# Patient Record
Sex: MALE | Race: WHITE | Employment: FULL TIME | URBAN - METROPOLITAN AREA
[De-identification: names, ages, dates, MRNs, and addresses within clinical notes are randomized per-mention and may not be internally consistent; named-entity substitution may affect disease eponyms.]

---

## 2017-05-04 PROBLEM — E66.09 NON MORBID OBESITY DUE TO EXCESS CALORIES: Status: ACTIVE | Noted: 2017-05-04

## 2017-05-04 PROBLEM — G56.20 TARDY ULNAR NERVE PALSY: Status: ACTIVE | Noted: 2017-05-04

## 2017-05-04 PROBLEM — G56.01 CARPAL TUNNEL SYNDROME OF RIGHT WRIST: Status: ACTIVE | Noted: 2017-05-04

## 2017-05-04 PROBLEM — S29.012A RHOMBOID MUSCLE STRAIN: Status: ACTIVE | Noted: 2017-05-04

## 2017-05-04 PROBLEM — S29.019A STRAIN OF THORACIC REGION: Status: ACTIVE | Noted: 2017-05-04

## 2018-07-19 ENCOUNTER — INITIAL CONSULT (OUTPATIENT)
Dept: SURGERY | Age: 53
End: 2018-07-19
Payer: COMMERCIAL

## 2018-07-19 VITALS
WEIGHT: 315 LBS | TEMPERATURE: 98.1 F | DIASTOLIC BLOOD PRESSURE: 82 MMHG | BODY MASS INDEX: 40.43 KG/M2 | HEART RATE: 65 BPM | OXYGEN SATURATION: 95 % | HEIGHT: 74 IN | SYSTOLIC BLOOD PRESSURE: 132 MMHG

## 2018-07-19 DIAGNOSIS — Z12.11 COLON CANCER SCREENING: Primary | ICD-10-CM

## 2018-07-19 PROCEDURE — 99203 OFFICE O/P NEW LOW 30 MIN: CPT | Performed by: SURGERY

## 2018-07-19 RX ORDER — GABAPENTIN 800 MG/1
TABLET ORAL
Refills: 1 | COMMUNITY
Start: 2018-06-15 | End: 2018-08-08 | Stop reason: ALTCHOICE

## 2018-07-19 RX ORDER — LEVOTHYROXINE SODIUM 0.05 MG/1
TABLET ORAL
COMMUNITY
Start: 2018-04-27

## 2018-07-19 RX ORDER — LEVOTHYROXINE SODIUM 0.03 MG/1
TABLET ORAL
Refills: 1 | COMMUNITY
Start: 2018-04-27 | End: 2018-08-08 | Stop reason: SDUPTHER

## 2018-07-19 RX ORDER — POLYETHYLENE GLYCOL 3350 17 G/17G
POWDER, FOR SOLUTION ORAL
Refills: 3 | COMMUNITY
Start: 2018-07-09 | End: 2018-08-08 | Stop reason: ALTCHOICE

## 2018-07-19 RX ORDER — PAZOPANIB HYDROCHLORIDE 200 MG/1
TABLET, FILM COATED ORAL
COMMUNITY
Start: 2018-07-03 | End: 2018-08-22 | Stop reason: ALTCHOICE

## 2018-07-19 RX ORDER — OXYCODONE HYDROCHLORIDE AND ACETAMINOPHEN 5; 325 MG/1; MG/1
TABLET ORAL
Refills: 0 | COMMUNITY
Start: 2018-07-10 | End: 2018-08-08 | Stop reason: ALTCHOICE

## 2018-07-19 ASSESSMENT — ENCOUNTER SYMPTOMS
BACK PAIN: 0
TROUBLE SWALLOWING: 0
WHEEZING: 0
CHEST TIGHTNESS: 0
EYE DISCHARGE: 0
ABDOMINAL PAIN: 0
SHORTNESS OF BREATH: 0
EYE PAIN: 0
COLOR CHANGE: 0
VOMITING: 0
ANAL BLEEDING: 0
CHOKING: 0
ABDOMINAL DISTENTION: 0

## 2018-07-19 NOTE — PROGRESS NOTES
AND TK PO ONCE D  3    amphetamine-dextroamphetamine (ADDERALL) 20 MG tablet TAKE 1 TABLET BY MOUTH 3 TIMES A DAY  0    folic acid (FOLVITE) 1 MG tablet TK 1 T PO QD  1    warfarin (COUMADIN) 10 MG tablet TK 1 T PO QD  3    DULoxetine (CYMBALTA) 20 MG extended release capsule Take 1 capsule by mouth nightly 30 capsule 0    gabapentin (NEURONTIN) 300 MG capsule Take 1 capsule by mouth nightly See titration instructions 30 capsule 0    warfarin (COUMADIN) 2.5 MG tablet TK 1 T PO QD  3     No current facility-administered medications for this visit. Allergies   Allergen Reactions    Codeine     Heparin          Review of Systems   Constitutional: Negative for chills, fatigue, fever and unexpected weight change. HENT: Negative for nosebleeds and trouble swallowing. Eyes: Negative for pain and discharge. Respiratory: Negative for choking, chest tightness, shortness of breath and wheezing. Cardiovascular: Negative for chest pain, palpitations and leg swelling. Gastrointestinal: Negative for abdominal distention, abdominal pain, anal bleeding and vomiting. Genitourinary: Negative for difficulty urinating, dysuria, flank pain and urgency. Musculoskeletal: Negative for arthralgias, back pain and neck stiffness. Skin: Negative for color change and wound. Neurological: Negative for dizziness, tremors, seizures, syncope and headaches. Hematological: Negative for adenopathy. Does not bruise/bleed easily. Psychiatric/Behavioral: Negative for agitation, behavioral problems and confusion. The patient is not nervous/anxious. Vitals:    07/19/18 1354   BP: 132/82   Pulse: 65   Temp: 98.1 °F (36.7 °C)   SpO2: 95%           Physical Exam   Constitutional: He is oriented to person, place, and time. He appears well-developed and well-nourished. HENT:   Head: Normocephalic and atraumatic. Eyes: Conjunctivae are normal. Pupils are equal, round, and reactive to light.    Neck: Normal range of motion. Cardiovascular: Normal rate and normal heart sounds. Pulmonary/Chest: Effort normal. No stridor. He has no wheezes. He has no rales. Abdominal: Soft. Bowel sounds are normal. He exhibits no mass. There is no tenderness. There is no rebound and no guarding. Genitourinary: Right testis shows no mass. Left testis shows no mass. Musculoskeletal: Normal range of motion. Lymphadenopathy:     He has no cervical adenopathy. Neurological: He is alert and oriented to person, place, and time. Skin: Skin is warm and dry. Psychiatric: He has a normal mood and affect.  His behavior is normal.               Assessment/      Need fro colonoscopy    Morbid obesity  S/p gastric bypass  Metastatic kidney cancer  PE and Coumadin              Plan/    Near prohibitive risk    Refer to GI        Susana Sylvester MD

## 2018-08-08 ENCOUNTER — OFFICE VISIT (OUTPATIENT)
Dept: PALLATIVE CARE | Age: 53
End: 2018-08-08
Payer: COMMERCIAL

## 2018-08-08 VITALS
DIASTOLIC BLOOD PRESSURE: 76 MMHG | HEART RATE: 74 BPM | BODY MASS INDEX: 40.43 KG/M2 | RESPIRATION RATE: 20 BRPM | OXYGEN SATURATION: 92 % | HEIGHT: 74 IN | WEIGHT: 315 LBS | SYSTOLIC BLOOD PRESSURE: 170 MMHG | TEMPERATURE: 99.2 F

## 2018-08-08 DIAGNOSIS — G47.00 INSOMNIA, UNSPECIFIED TYPE: ICD-10-CM

## 2018-08-08 DIAGNOSIS — Z71.89 COUNSELING AND COORDINATION OF CARE: ICD-10-CM

## 2018-08-08 DIAGNOSIS — Z51.5 PALLIATIVE CARE ENCOUNTER: ICD-10-CM

## 2018-08-08 DIAGNOSIS — I87.2 CHRONIC STASIS DERMATITIS: ICD-10-CM

## 2018-08-08 DIAGNOSIS — K59.01 SLOW TRANSIT CONSTIPATION: ICD-10-CM

## 2018-08-08 DIAGNOSIS — G89.3 NEOPLASM RELATED PAIN: Primary | ICD-10-CM

## 2018-08-08 DIAGNOSIS — C64.1 RENAL CELL CARCINOMA OF RIGHT KIDNEY (HCC): ICD-10-CM

## 2018-08-08 DIAGNOSIS — R45.86 EMOTIONAL LABILITY: ICD-10-CM

## 2018-08-08 PROCEDURE — 99205 OFFICE O/P NEW HI 60 MIN: CPT | Performed by: NURSE PRACTITIONER

## 2018-08-08 RX ORDER — AMOXICILLIN 250 MG
1 CAPSULE ORAL 2 TIMES DAILY
Qty: 60 TABLET | Refills: 1 | Status: SHIPPED | OUTPATIENT
Start: 2018-08-08 | End: 2018-10-15 | Stop reason: ALTCHOICE

## 2018-08-08 RX ORDER — TAPENTADOL HYDROCHLORIDE 50 MG/1
TABLET, FILM COATED ORAL 3 TIMES DAILY PRN
COMMUNITY
Start: 2018-07-27 | End: 2018-08-08 | Stop reason: ALTCHOICE

## 2018-08-08 RX ORDER — AMMONIUM LACTATE 12 G/100G
LOTION TOPICAL
Qty: 1 BOTTLE | Refills: 2 | Status: SHIPPED | OUTPATIENT
Start: 2018-08-08 | End: 2018-11-27

## 2018-08-08 RX ORDER — METHYLPREDNISOLONE 4 MG/1
TABLET ORAL
COMMUNITY
Start: 2018-08-06 | End: 2018-08-08 | Stop reason: ALTCHOICE

## 2018-08-08 RX ORDER — OXYCODONE HYDROCHLORIDE 5 MG/1
TABLET ORAL
Qty: 120 TABLET | Refills: 0 | Status: SHIPPED | OUTPATIENT
Start: 2018-08-08 | End: 2018-08-22 | Stop reason: DRUGHIGH

## 2018-08-08 RX ORDER — DULOXETIN HYDROCHLORIDE 30 MG/1
30 CAPSULE, DELAYED RELEASE ORAL DAILY
Qty: 30 CAPSULE | Refills: 3 | Status: SHIPPED | OUTPATIENT
Start: 2018-08-08 | End: 2018-08-22 | Stop reason: SINTOL

## 2018-08-08 ASSESSMENT — ENCOUNTER SYMPTOMS
WHEEZING: 0
CONSTIPATION: 1
VOMITING: 0
BLURRED VISION: 0
SHORTNESS OF BREATH: 0
DIARRHEA: 0
HEMOPTYSIS: 0
BACK PAIN: 1
HEARTBURN: 0
ORTHOPNEA: 0
EYE PAIN: 0
DOUBLE VISION: 0
ABDOMINAL PAIN: 0
NAUSEA: 0
COUGH: 0
BLOOD IN STOOL: 0
SORE THROAT: 0
SPUTUM PRODUCTION: 0

## 2018-08-08 NOTE — PROGRESS NOTES
Sage Arteaga seen and examined this 48y.o. year old pt of Dr. Santos Reynolds MD at the clinic at [facility], who was referred to palliative care by Dr. Jonatan Jenkins for symptom management, advance care planning, and goals of care conversation. Pt was diagnosed with RCC in late 2016 and he is s/p radical right nephrectomy done at [facility]. 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 He has received both XRT and chemo. Medical oncology notes reviewed. He had recent disease progression with new extradural metastasis in July 2018. He is not a candidate for further XRT. Medical treatment changed. Votrient DC'd. He is pending insurance approval for Buck. Pt A&O x3, NAD, pleasant and cooperative. Wife present for visit. 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 Pain radiates down entire arm to level of wrist. No weakness of extremity noted. No change in sensation. No edema. He rates pain 9/10 at its worst and 3/10 at its best. Describes pain  As stabbing in his back and aching and throbbing to right arm. Pt saw pain management and was started on nucynta and lyrica approx 2 weeks ago. He reports no improved pain relief. Pt has documented codeine allergy but states that it was more of an adverse reaction of dizziness. He c/o constipation often going 3-4 days with no BM. Stool hard to pass. Denies n/v or abd pain. He is taking miralax prn with little effect. Appetite good. No weight loss noted. Denies dysphagia or dysgeusia. He c/o insomnia. He is  and reports unusual sleeping hours and habits. He also c/o depression and sense of being overwhelmed with changes in his life related to cancer and treatment thereof. Ambulates independently. Strength is good.  There has been no anxiety or agitation episodes. No reports of suicidal or homicidal ideation. He wishes to remain a full code. No LW or HCPOA in place. Past Surgical History:   Procedure Laterality Date    CHOLECYSTECTOMY      GASTRIC BYPASS SURGERY N/A     KIDNEY REMOVAL Right      Breast Cancer-related family history is not on file. Social History     Social History    Marital status:      Spouse name: N/A    Number of children: N/A    Years of education: N/A     Occupational History    Not on file. Social History Main Topics    Smoking status: Never Smoker    Smokeless tobacco: Never Used    Alcohol use No    Drug use: No    Sexual activity: Not on file     Other Topics Concern    Not on file     Social History Narrative    No narrative on file       Review of Systems   Constitutional: Negative for chills, diaphoresis, fever, malaise/fatigue and weight loss. HENT: Negative for congestion, hearing loss and sore throat. Eyes: Negative for blurred vision, double vision and pain. Respiratory: Negative for cough, hemoptysis, sputum production, shortness of breath and wheezing. Cardiovascular: Positive for leg swelling (chronic). Negative for chest pain, palpitations, orthopnea and claudication. Gastrointestinal: Positive for constipation. Negative for abdominal pain, blood in stool, diarrhea, heartburn, melena, nausea and vomiting. Genitourinary: Negative for dysuria, frequency, hematuria and urgency. Musculoskeletal: Positive for back pain. Skin: Negative for itching and rash. Neurological: Negative for dizziness, tingling, tremors, sensory change, speech change, focal weakness, seizures, loss of consciousness, weakness and headaches. Psychiatric/Behavioral: Positive for depression. Negative for hallucinations, memory loss, substance abuse and suicidal ideas. The patient has insomnia. The patient is not nervous/anxious.        BP (!) 170/76 (Site: Left Arm, Position: Sitting)   Pulse 74   Temp support. Much active listening, presence, and emotional support were given. Thank you for the opportunity you have allowed us to provide palliative care to this patient in need of comfort and support. We will continue to be in touch with you as care progresses. Return in about 2 weeks (around 8/22/2018), or if symptoms worsen or fail to improve.     Total time spent: 65 mins  >50% time spent counseling and coordination of care: yes     Nani Casas, APRN - CNP    Collaborating physicians: Dr Johan Kelly and Dr Karolina Chew

## 2018-08-22 ENCOUNTER — OFFICE VISIT (OUTPATIENT)
Dept: PALLATIVE CARE | Age: 53
End: 2018-08-22
Payer: COMMERCIAL

## 2018-08-22 VITALS
BODY MASS INDEX: 54.54 KG/M2 | DIASTOLIC BLOOD PRESSURE: 94 MMHG | OXYGEN SATURATION: 92 % | WEIGHT: 315 LBS | SYSTOLIC BLOOD PRESSURE: 158 MMHG | HEART RATE: 73 BPM | TEMPERATURE: 99.6 F | RESPIRATION RATE: 20 BRPM

## 2018-08-22 DIAGNOSIS — C64.9 RENAL CELL CARCINOMA, UNSPECIFIED LATERALITY (HCC): ICD-10-CM

## 2018-08-22 DIAGNOSIS — G89.3 NEOPLASM RELATED PAIN: Primary | ICD-10-CM

## 2018-08-22 DIAGNOSIS — Z51.5 PALLIATIVE CARE ENCOUNTER: ICD-10-CM

## 2018-08-22 DIAGNOSIS — R45.86 EMOTIONAL LABILITY: ICD-10-CM

## 2018-08-22 DIAGNOSIS — I10 HYPERTENSION, UNSPECIFIED TYPE: ICD-10-CM

## 2018-08-22 PROCEDURE — 99215 OFFICE O/P EST HI 40 MIN: CPT | Performed by: NURSE PRACTITIONER

## 2018-08-22 RX ORDER — FENTANYL 25 UG/H
1 PATCH TRANSDERMAL
Qty: 3 PATCH | Refills: 0 | Status: SHIPPED | OUTPATIENT
Start: 2018-08-22 | End: 2018-09-01

## 2018-08-22 RX ORDER — OXYCODONE HYDROCHLORIDE 15 MG/1
15 TABLET ORAL EVERY 4 HOURS PRN
Qty: 90 TABLET | Refills: 0 | Status: SHIPPED | OUTPATIENT
Start: 2018-08-22 | End: 2018-09-06 | Stop reason: DRUGHIGH

## 2018-08-22 ASSESSMENT — ENCOUNTER SYMPTOMS
CHEST TIGHTNESS: 0
CONSTIPATION: 0
WHEEZING: 0
SHORTNESS OF BREATH: 0
ABDOMINAL PAIN: 0
VOMITING: 0
BACK PAIN: 1
ABDOMINAL DISTENTION: 0
NAUSEA: 0
DIARRHEA: 0
TROUBLE SWALLOWING: 0
COLOR CHANGE: 0
COUGH: 0

## 2018-08-22 NOTE — PROGRESS NOTES
Social History Narrative    No narrative on file     Allergies   Allergen Reactions    Codeine     Heparin      Current Outpatient Prescriptions on File Prior to Visit   Medication Sig Dispense Refill    senna-docusate (SENOKOT S) 8.6-50 MG per tablet Take 1 tablet by mouth 2 times daily 60 tablet 1    ammonium lactate (LAC-HYDRIN) 12 % lotion Apply topically daily. 1 Bottle 2    levothyroxine (SYNTHROID) 25 MCG tablet TK 1 T PO D      amphetamine-dextroamphetamine (ADDERALL) 20 MG tablet TAKE 1 TABLET BY MOUTH 3 TIMES A DAY  0    folic acid (FOLVITE) 1 MG tablet TK 1 T PO QD  1    warfarin (COUMADIN) 10 MG tablet TK 1 T PO QD  3     No current facility-administered medications on file prior to visit. Review of Systems   Constitutional: Negative for activity change, appetite change, chills, fatigue, fever and unexpected weight change. HENT: Negative for congestion, hearing loss, mouth sores and trouble swallowing. Eyes: Negative for visual disturbance. Respiratory: Negative for cough, chest tightness, shortness of breath and wheezing. Cardiovascular: Negative for chest pain, palpitations and leg swelling. Gastrointestinal: Negative for abdominal distention, abdominal pain, constipation, diarrhea, nausea and vomiting. Musculoskeletal: Positive for back pain. Negative for gait problem. Skin: Negative for color change and rash. Neurological: Negative for dizziness, tremors, seizures, syncope, facial asymmetry, speech difficulty, weakness, light-headedness, numbness and headaches. Psychiatric/Behavioral: Positive for dysphoric mood. Negative for agitation, confusion, hallucinations, sleep disturbance and suicidal ideas. The patient is not nervous/anxious.           Objective:   BP (!) 158/94 (Site: Right Arm, Position: Sitting)   Pulse 73   Temp 99.6 °F (37.6 °C)   Resp 20   Wt (!) 424 lb 12.8 oz (192.7 kg)   SpO2 92%   BMI 54.54 kg/m²     Physical Exam   Constitutional: He unspecified type  - Advised pt to get BP cuff and check BP twice daily and record in log. Call for SBP >160. If BP elevated at home Advised him to make appt with PCP to discuss elevated BP and take BP log for PCP to review. 4. Emotional lability  - DC Cymbalta due to adverse effects, Pt only took one dose  - sertraline (ZOLOFT) 50 MG tablet; Take 1/2 tab PO daily x 2 weeks then 1 tab PO daily therafter  Dispense: 30 tablet; Refill: 3    5. Palliative care encounter  Call for any questions, concerns or change in condition. Much support, guidance and active listening provided. Return in about 7 days (around 8/29/2018), or if symptoms worsen or fail to improve.     Karen Chaparro, APRN - CNP    Collaborating Physicians: Dr Omar Pruitt and Dr Munira Cherry

## 2018-09-05 ENCOUNTER — TELEPHONE (OUTPATIENT)
Dept: PALLATIVE CARE | Age: 53
End: 2018-09-05

## 2018-09-05 DIAGNOSIS — Z51.5 PALLIATIVE CARE PATIENT: ICD-10-CM

## 2018-09-05 DIAGNOSIS — C64.9 METASTATIC RENAL CELL CARCINOMA, UNSPECIFIED LATERALITY (HCC): ICD-10-CM

## 2018-09-05 DIAGNOSIS — G89.3 NEOPLASM RELATED PAIN: Primary | ICD-10-CM

## 2018-09-06 ENCOUNTER — CLINICAL DOCUMENTATION (OUTPATIENT)
Dept: PALLATIVE CARE | Age: 53
End: 2018-09-06

## 2018-09-06 RX ORDER — FENTANYL 50 UG/H
1 PATCH TRANSDERMAL
Qty: 3 PATCH | Refills: 0 | Status: SHIPPED | OUTPATIENT
Start: 2018-09-06 | End: 2018-09-10 | Stop reason: SDUPTHER

## 2018-09-06 RX ORDER — DEXAMETHASONE 1 MG
1 TABLET ORAL 2 TIMES DAILY WITH MEALS
Qty: 60 TABLET | Refills: 0 | Status: SHIPPED | OUTPATIENT
Start: 2018-09-06 | End: 2018-10-02 | Stop reason: ALTCHOICE

## 2018-09-06 RX ORDER — OXYCODONE HYDROCHLORIDE 20 MG/1
20 TABLET ORAL EVERY 4 HOURS PRN
Qty: 42 TABLET | Refills: 0 | Status: SHIPPED | OUTPATIENT
Start: 2018-09-06 | End: 2018-10-08 | Stop reason: SDUPTHER

## 2018-09-10 DIAGNOSIS — Z51.5 PALLIATIVE CARE PATIENT: ICD-10-CM

## 2018-09-10 DIAGNOSIS — C64.9 METASTATIC RENAL CELL CARCINOMA, UNSPECIFIED LATERALITY (HCC): ICD-10-CM

## 2018-09-10 DIAGNOSIS — G89.3 NEOPLASM RELATED PAIN: ICD-10-CM

## 2018-09-10 RX ORDER — FENTANYL 50 UG/H
1 PATCH TRANSDERMAL
Qty: 3 PATCH | Refills: 0 | Status: SHIPPED | OUTPATIENT
Start: 2018-09-10 | End: 2018-10-15 | Stop reason: ALTCHOICE

## 2018-09-10 NOTE — TELEPHONE ENCOUNTER
Pt called to reschedule missed appt. Scheduled for next Wed - if cancellation tomorrow, I will add him sooner. Only has 2 Fentanyl patches left. Requesting refill.  TY

## 2018-10-02 ENCOUNTER — OFFICE VISIT (OUTPATIENT)
Dept: GASTROENTEROLOGY | Age: 53
End: 2018-10-02
Payer: COMMERCIAL

## 2018-10-02 VITALS
OXYGEN SATURATION: 96 % | WEIGHT: 315 LBS | DIASTOLIC BLOOD PRESSURE: 78 MMHG | BODY MASS INDEX: 40.43 KG/M2 | TEMPERATURE: 98.1 F | SYSTOLIC BLOOD PRESSURE: 118 MMHG | HEIGHT: 74 IN | HEART RATE: 88 BPM

## 2018-10-02 DIAGNOSIS — K59.00 CONSTIPATION, UNSPECIFIED CONSTIPATION TYPE: Primary | ICD-10-CM

## 2018-10-02 PROCEDURE — 99205 OFFICE O/P NEW HI 60 MIN: CPT | Performed by: INTERNAL MEDICINE

## 2018-10-02 ASSESSMENT — ENCOUNTER SYMPTOMS
APNEA: 0
EYE DISCHARGE: 0
ABDOMINAL PAIN: 1
VOMITING: 0
BACK PAIN: 0
DIARRHEA: 0
COUGH: 0
SHORTNESS OF BREATH: 0
EYE PAIN: 0
CONSTIPATION: 1
EYE ITCHING: 0

## 2018-10-02 NOTE — PROGRESS NOTES
2.         Screening of colon cancer. Plan:         Diagnosis Orders   1. Constipation, unspecified constipation type             Differential diagnosis: Low fiber diet, Organic (Colorectal cancer, extraintestinal mass, postinflammatory, ischemic, or surgical stenosis), anal strictures, Drugs Opiates. Increase fluid intake: 10-12 glasses of water  Normal TSH, calcium  Miralax 17 grams every 12 hours PRN     I offered the patient a colonoscopy with anesthesia for further evaluation, and as a screening as well. The risks, benefits and alternatives of the procedure were explained to the patient in detail; including but not limited to; reaction to medication, infection, bleeding, perforation and the need for possible emergency surgery should a complication arise. An opportunity for questions was provided and the patient and his wife does not want to proceed with a colonoscopy at the moment. if he changes his mind and would like a colonoscopy he will return to my office. The patient verbalizes understanding and is in agreement with the assessment and plan. The patient was provided with the opportunity to ask questions during this encounter and all questions were answered to the patient's satisfaction.    - >50% of the 40 minutes was spent on counseling, coordinating care based on my plan and assessment as noted above.

## 2018-10-08 ENCOUNTER — TELEPHONE (OUTPATIENT)
Dept: PALLATIVE CARE | Age: 53
End: 2018-10-08

## 2018-10-08 DIAGNOSIS — C64.9 METASTATIC RENAL CELL CARCINOMA, UNSPECIFIED LATERALITY (HCC): ICD-10-CM

## 2018-10-08 DIAGNOSIS — Z51.5 PALLIATIVE CARE PATIENT: ICD-10-CM

## 2018-10-08 DIAGNOSIS — G89.3 NEOPLASM RELATED PAIN: ICD-10-CM

## 2018-10-08 RX ORDER — OXYCODONE HYDROCHLORIDE 20 MG/1
20 TABLET ORAL EVERY 4 HOURS PRN
Qty: 42 TABLET | Refills: 0 | Status: SHIPPED | OUTPATIENT
Start: 2018-10-08 | End: 2018-11-01 | Stop reason: SDUPTHER

## 2018-10-08 RX ORDER — FENTANYL 75 UG/H
1 PATCH TRANSDERMAL
Qty: 5 PATCH | Refills: 0 | Status: SHIPPED | OUTPATIENT
Start: 2018-10-08 | End: 2018-10-16 | Stop reason: DRUGHIGH

## 2018-10-15 ENCOUNTER — OFFICE VISIT (OUTPATIENT)
Dept: PALLATIVE CARE | Age: 53
End: 2018-10-15
Payer: COMMERCIAL

## 2018-10-15 VITALS
RESPIRATION RATE: 20 BRPM | HEART RATE: 78 BPM | SYSTOLIC BLOOD PRESSURE: 136 MMHG | OXYGEN SATURATION: 96 % | DIASTOLIC BLOOD PRESSURE: 64 MMHG | WEIGHT: 315 LBS | BODY MASS INDEX: 55.77 KG/M2 | TEMPERATURE: 98.1 F

## 2018-10-15 DIAGNOSIS — G89.3 CHRONIC NEOPLASM-RELATED PAIN: Primary | ICD-10-CM

## 2018-10-15 DIAGNOSIS — K59.00 CONSTIPATION, UNSPECIFIED CONSTIPATION TYPE: ICD-10-CM

## 2018-10-15 DIAGNOSIS — C64.1 RENAL CELL CARCINOMA OF RIGHT KIDNEY METASTATIC TO OTHER SITE (HCC): ICD-10-CM

## 2018-10-15 DIAGNOSIS — Z51.5 PALLIATIVE CARE ENCOUNTER: ICD-10-CM

## 2018-10-15 DIAGNOSIS — R06.02 SOB (SHORTNESS OF BREATH): ICD-10-CM

## 2018-10-15 PROCEDURE — 99215 OFFICE O/P EST HI 40 MIN: CPT | Performed by: NURSE PRACTITIONER

## 2018-10-15 RX ORDER — POLYETHYLENE GLYCOL 3350 17 G/17G
17 POWDER, FOR SOLUTION ORAL DAILY PRN
Status: ON HOLD | COMMUNITY
Start: 2018-07-09 | End: 2018-12-06

## 2018-10-15 RX ORDER — GABAPENTIN 300 MG/1
300 CAPSULE ORAL 3 TIMES DAILY
Qty: 90 CAPSULE | Refills: 3 | Status: SHIPPED | OUTPATIENT
Start: 2018-10-15 | End: 2018-11-27

## 2018-10-15 RX ORDER — SENNOSIDES 8.6 MG
1 CAPSULE ORAL 2 TIMES DAILY
Refills: 1 | COMMUNITY
Start: 2018-08-08 | End: 2018-10-15 | Stop reason: SDUPTHER

## 2018-10-15 RX ORDER — SENNOSIDES 8.6 MG
3 CAPSULE ORAL 2 TIMES DAILY
Qty: 180 CAPSULE | Refills: 1
Start: 2018-10-15 | End: 2018-11-27

## 2018-10-15 RX ORDER — POLYETHYLENE GLYCOL 3350 17 G/17G
17 POWDER, FOR SOLUTION ORAL DAILY
Qty: 510 G | Refills: 0
Start: 2018-10-15 | End: 2018-11-01 | Stop reason: SDUPTHER

## 2018-10-15 ASSESSMENT — ENCOUNTER SYMPTOMS
WHEEZING: 0
CHEST TIGHTNESS: 0
COLOR CHANGE: 0
COUGH: 0
SHORTNESS OF BREATH: 1
CONSTIPATION: 1
BACK PAIN: 1
TROUBLE SWALLOWING: 0
VOMITING: 0
DIARRHEA: 0
ABDOMINAL DISTENTION: 0
ABDOMINAL PAIN: 0
NAUSEA: 0

## 2018-10-15 NOTE — PROGRESS NOTES
Subjective:      Patient Id:  Lesia Emery is a 48 y.o. male who presents today with   Chief Complaint   Patient presents with    Arm Pain    Back Pain     R upper    Shortness of Breath     with activity    and is seen at the clinic at [facility] Pt seen and examined at [facility] for routine follow up visit for sx management r/t metastatic RCC. Pt has not been seen in 2 months due to cancelled appts. He reports that he was admitted to [facility] in Sept for SOB and according to patient he was found to have partially collapsed lung. He was hospitalized for 13 days. No hospital records available for review at today's visit. He was discharged to home on steroids and he is now tapered off of those as of 1-2 weeks ago. Overall he reports his SOB is improved since hospitalized. He still c/o ROLLE. C/o feeling feverish and chills x 2 days but when he took his temperature it was normal. He denies cough or wheeze or cp/pressure. No new edema. Pt with c/o pain to right arm, He describes it as cold, numb and burning. Pain is present constantly. Pain improves with use of prn oxycodone for a few hours. Pain is 10/10 at its worst and then 3-4/10 at its best.    Pt is scheduled for follow up scans to assess cancer status at the beginning of Nov.    Pt reports that he still feels constipated despite taking senna 2 tabs BID. Denies n/v or abd pain. 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 Social History Main Topics    Smoking status: Never Smoker    Smokeless tobacco: Never Used    Alcohol use No    Drug use: No    Sexual activity: Not on file     Other Topics Concern    Not on file     Social History Narrative    No narrative on file     Allergies   Allergen Reactions    Codeine     Heparin      Current Outpatient Prescriptions on File Prior to Visit   Medication Sig Dispense Refill    oxyCODONE (ROXICODONE) 20 MG immediate release tablet Take 1 tablet by mouth every 4 hours as needed for Pain for up to 7 days. . 42 tablet 0    fentaNYL (DURAGESIC) 75 MCG/HR Place 1 patch onto the skin every 72 hours for 15 days. . 5 patch 0    ammonium lactate (LAC-HYDRIN) 12 % lotion Apply topically daily. 1 Bottle 2    levothyroxine (SYNTHROID) 25 MCG tablet TK 1 T PO D      folic acid (FOLVITE) 1 MG tablet TK 1 T PO QD  1    warfarin (COUMADIN) 10 MG tablet TK 1 T PO QD  3    amphetamine-dextroamphetamine (ADDERALL) 20 MG tablet TAKE 1 TABLET BY MOUTH 3 TIMES A DAY  0     No current facility-administered medications on file prior to visit. Review of Systems   Constitutional: Positive for chills and fatigue. Negative for activity change, appetite change, fever and unexpected weight change. HENT: Negative for congestion, hearing loss, mouth sores and trouble swallowing. Respiratory: Positive for shortness of breath. Negative for cough, chest tightness and wheezing. Cardiovascular: Positive for leg swelling (chronic). Negative for chest pain and palpitations. Gastrointestinal: Positive for constipation. Negative for abdominal distention, abdominal pain, diarrhea, nausea and vomiting. Genitourinary: Negative for difficulty urinating, dysuria and frequency. Musculoskeletal: Positive for back pain. Negative for gait problem. Skin: Negative for color change and rash. Neurological: Negative for dizziness, syncope and headaches.    Psychiatric/Behavioral: Negative for

## 2018-10-16 ENCOUNTER — TELEPHONE (OUTPATIENT)
Dept: PALLATIVE CARE | Age: 53
End: 2018-10-16

## 2018-10-16 DIAGNOSIS — C64.1 RENAL CELL CARCINOMA OF RIGHT KIDNEY METASTATIC TO OTHER SITE (HCC): ICD-10-CM

## 2018-10-16 DIAGNOSIS — G89.3 NEOPLASM RELATED PAIN: Primary | ICD-10-CM

## 2018-10-16 DIAGNOSIS — Z51.5 PALLIATIVE CARE PATIENT: ICD-10-CM

## 2018-10-16 RX ORDER — IBUPROFEN 600 MG/1
600 TABLET ORAL EVERY 6 HOURS
Qty: 12 TABLET | Refills: 0 | Status: SHIPPED | OUTPATIENT
Start: 2018-10-16 | End: 2018-11-01 | Stop reason: ALTCHOICE

## 2018-10-16 RX ORDER — FENTANYL 100 UG/H
1 PATCH TRANSDERMAL
Qty: 10 PATCH | Refills: 0 | Status: SHIPPED | OUTPATIENT
Start: 2018-10-16 | End: 2018-11-27

## 2018-10-16 NOTE — TELEPHONE ENCOUNTER
Discussed pt case at [facility] and pt continued uncontrolled pain. Will increase fentanyl transdermal to 100mcg/hr, cont prn oxy IR as prescribed, cont gabapentin 300mg TID as started yesterday and start ibuprofen 600mg Q6hr x 3 days (take with food). Kidney function 10/10 WNL. Called and updated pt on POC. Pt agreeable. Will reach out to pain management or interventional radiology to see if there are any procedures available that may help pt with his pain control. If no improvement in pain control with current changes will consider methadone.

## 2018-11-01 ENCOUNTER — OFFICE VISIT (OUTPATIENT)
Dept: PALLATIVE CARE | Age: 53
End: 2018-11-01
Payer: COMMERCIAL

## 2018-11-01 VITALS
SYSTOLIC BLOOD PRESSURE: 118 MMHG | OXYGEN SATURATION: 96 % | RESPIRATION RATE: 18 BRPM | DIASTOLIC BLOOD PRESSURE: 82 MMHG | HEART RATE: 74 BPM

## 2018-11-01 DIAGNOSIS — Z51.5 PALLIATIVE CARE PATIENT: ICD-10-CM

## 2018-11-01 DIAGNOSIS — R60.9 EDEMA, UNSPECIFIED TYPE: ICD-10-CM

## 2018-11-01 DIAGNOSIS — C64.9 METASTATIC RENAL CELL CARCINOMA, UNSPECIFIED LATERALITY (HCC): ICD-10-CM

## 2018-11-01 DIAGNOSIS — R06.02 SOB (SHORTNESS OF BREATH): ICD-10-CM

## 2018-11-01 DIAGNOSIS — K59.00 CONSTIPATION, UNSPECIFIED CONSTIPATION TYPE: ICD-10-CM

## 2018-11-01 DIAGNOSIS — G89.3 NEOPLASM RELATED PAIN: Primary | ICD-10-CM

## 2018-11-01 PROCEDURE — 99349 HOME/RES VST EST MOD MDM 40: CPT | Performed by: NURSE PRACTITIONER

## 2018-11-01 RX ORDER — OXYCODONE HYDROCHLORIDE 20 MG/1
20 TABLET ORAL EVERY 4 HOURS PRN
Qty: 42 TABLET | Refills: 0 | Status: SHIPPED | OUTPATIENT
Start: 2018-11-01 | End: 2018-11-27

## 2018-11-01 RX ORDER — PHENAZOPYRIDINE HYDROCHLORIDE 200 MG/1
200 TABLET, FILM COATED ORAL 3 TIMES DAILY PRN
COMMUNITY
End: 2018-11-27

## 2018-11-01 ASSESSMENT — ENCOUNTER SYMPTOMS
BACK PAIN: 1
COUGH: 0
SHORTNESS OF BREATH: 0
CONSTIPATION: 0
ABDOMINAL PAIN: 0
NAUSEA: 0
DIARRHEA: 0
COLOR CHANGE: 0
WHEEZING: 0
VOMITING: 0
CHEST TIGHTNESS: 0
TROUBLE SWALLOWING: 0
ABDOMINAL DISTENTION: 0

## 2018-11-02 ENCOUNTER — TELEPHONE (OUTPATIENT)
Dept: PALLATIVE CARE | Age: 53
End: 2018-11-02

## 2018-11-02 DIAGNOSIS — G89.3 NEOPLASM RELATED PAIN: ICD-10-CM

## 2018-11-02 DIAGNOSIS — C64.9 RENAL CELL CARCINOMA, UNSPECIFIED LATERALITY (HCC): Primary | ICD-10-CM

## 2018-11-02 DIAGNOSIS — R53.81 DEBILITY: ICD-10-CM

## 2018-11-02 DIAGNOSIS — R26.81 GAIT INSTABILITY: ICD-10-CM

## 2018-11-02 DIAGNOSIS — R06.02 SOB (SHORTNESS OF BREATH): ICD-10-CM

## 2018-11-02 DIAGNOSIS — J44.9 CHRONIC OBSTRUCTIVE PULMONARY DISEASE, UNSPECIFIED COPD TYPE (HCC): ICD-10-CM

## 2018-11-02 NOTE — TELEPHONE ENCOUNTER
Called and spoke with admissions director at [facility]. Advised of pt need for inpatient skilled nursing and therapy. Was informed that since it has been greater than 48 hrs since pt hospital DC that he will need new eval and recommendations from physical therapy. Will order [facility] for nursing and PT eval and treat.  Called and updated pt wife

## 2018-11-27 ENCOUNTER — OFFICE VISIT (OUTPATIENT)
Dept: PALLATIVE CARE | Age: 53
End: 2018-11-27
Payer: COMMERCIAL

## 2018-11-27 VITALS
HEART RATE: 78 BPM | DIASTOLIC BLOOD PRESSURE: 74 MMHG | OXYGEN SATURATION: 98 % | SYSTOLIC BLOOD PRESSURE: 133 MMHG | RESPIRATION RATE: 18 BRPM

## 2018-11-27 DIAGNOSIS — G62.9 NEUROPATHY: ICD-10-CM

## 2018-11-27 DIAGNOSIS — K59.00 CONSTIPATION, UNSPECIFIED CONSTIPATION TYPE: ICD-10-CM

## 2018-11-27 DIAGNOSIS — Z51.5 PALLIATIVE CARE ENCOUNTER: ICD-10-CM

## 2018-11-27 DIAGNOSIS — C64.9 METASTATIC RENAL CELL CARCINOMA, UNSPECIFIED LATERALITY (HCC): ICD-10-CM

## 2018-11-27 DIAGNOSIS — G89.3 NEOPLASM RELATED PAIN: Primary | ICD-10-CM

## 2018-11-27 PROCEDURE — 99308 SBSQ NF CARE LOW MDM 20: CPT | Performed by: NURSE PRACTITIONER

## 2018-11-27 RX ORDER — MELOXICAM 7.5 MG/1
7.5 TABLET ORAL DAILY
COMMUNITY

## 2018-11-27 RX ORDER — FENTANYL 75 UG/H
1 PATCH TRANSDERMAL
COMMUNITY
End: 2018-12-05 | Stop reason: SDUPTHER

## 2018-11-27 RX ORDER — DEXAMETHASONE 4 MG/1
4 TABLET ORAL
COMMUNITY

## 2018-11-27 RX ORDER — GABAPENTIN 300 MG/1
900 CAPSULE ORAL 3 TIMES DAILY
COMMUNITY

## 2018-11-27 RX ORDER — TORSEMIDE 100 MG/1
100 TABLET ORAL DAILY
COMMUNITY

## 2018-11-27 RX ORDER — LACTULOSE 20 G/30ML
30 SOLUTION ORAL 3 TIMES DAILY
Status: ON HOLD | COMMUNITY
End: 2018-12-14 | Stop reason: HOSPADM

## 2018-11-27 RX ORDER — ERGOCALCIFEROL (VITAMIN D2) 1250 MCG
50000 CAPSULE ORAL WEEKLY
Status: ON HOLD | COMMUNITY
End: 2018-12-06

## 2018-11-27 ASSESSMENT — ENCOUNTER SYMPTOMS
CONSTIPATION: 1
ABDOMINAL PAIN: 0
TROUBLE SWALLOWING: 0
NAUSEA: 0
COUGH: 0
VOMITING: 0
SHORTNESS OF BREATH: 0
WHEEZING: 0
COLOR CHANGE: 0
DIARRHEA: 0
ABDOMINAL DISTENTION: 0
CHEST TIGHTNESS: 0

## 2018-11-27 NOTE — PROGRESS NOTES
progression and patients expected quality of life. I emphasized the palliative care support throughout the course of the disease regardless of choice of treatments. I noted the availability of support as needed through our , spiritual care and bereavement support team. Discussed availability of LSW, , and grief support. Much active listening, presence, and emotional support were given. Return in about 7 days (around 12/4/2018), or if symptoms worsen or fail to improve.     Jori Castañeda, APRN - CNP    Collaborating Physicians: Dr Coni Huber and Dr Brigid Zambrano

## 2018-12-03 ENCOUNTER — APPOINTMENT (OUTPATIENT)
Dept: CT IMAGING | Age: 53
End: 2018-12-03
Payer: COMMERCIAL

## 2018-12-03 ENCOUNTER — HOSPITAL ENCOUNTER (EMERGENCY)
Age: 53
Discharge: NURSING, REHAB OR LONG-TERM CARE | End: 2018-12-03
Attending: EMERGENCY MEDICINE
Payer: COMMERCIAL

## 2018-12-03 VITALS
DIASTOLIC BLOOD PRESSURE: 82 MMHG | OXYGEN SATURATION: 95 % | TEMPERATURE: 97.4 F | RESPIRATION RATE: 20 BRPM | HEART RATE: 81 BPM | SYSTOLIC BLOOD PRESSURE: 124 MMHG | BODY MASS INDEX: 40.43 KG/M2 | HEIGHT: 74 IN | WEIGHT: 315 LBS

## 2018-12-03 DIAGNOSIS — K59.00 CONSTIPATION, UNSPECIFIED CONSTIPATION TYPE: Primary | ICD-10-CM

## 2018-12-03 LAB — POC CREATININE WHOLE BLOOD: 1

## 2018-12-03 PROCEDURE — 6360000002 HC RX W HCPCS: Performed by: EMERGENCY MEDICINE

## 2018-12-03 PROCEDURE — 96372 THER/PROPH/DIAG INJ SC/IM: CPT

## 2018-12-03 PROCEDURE — 74177 CT ABD & PELVIS W/CONTRAST: CPT

## 2018-12-03 PROCEDURE — 6360000004 HC RX CONTRAST MEDICATION: Performed by: EMERGENCY MEDICINE

## 2018-12-03 PROCEDURE — 99284 EMERGENCY DEPT VISIT MOD MDM: CPT

## 2018-12-03 PROCEDURE — 2580000003 HC RX 258: Performed by: EMERGENCY MEDICINE

## 2018-12-03 RX ORDER — SODIUM CHLORIDE 0.9 % (FLUSH) 0.9 %
10 SYRINGE (ML) INJECTION ONCE
Status: COMPLETED | OUTPATIENT
Start: 2018-12-03 | End: 2018-12-03

## 2018-12-03 RX ORDER — IPRATROPIUM BROMIDE AND ALBUTEROL SULFATE 2.5; .5 MG/3ML; MG/3ML
1 SOLUTION RESPIRATORY (INHALATION)
COMMUNITY

## 2018-12-03 RX ORDER — DEXAMETHASONE 2 MG/1
2 TABLET ORAL NIGHTLY
COMMUNITY
End: 2019-01-10 | Stop reason: DRUGHIGH

## 2018-12-03 RX ORDER — SENNA PLUS 8.6 MG/1
1 TABLET ORAL 2 TIMES DAILY PRN
Status: ON HOLD | COMMUNITY
End: 2018-12-06

## 2018-12-03 RX ORDER — OXYCODONE HYDROCHLORIDE 30 MG/1
20 TABLET ORAL EVERY 4 HOURS PRN
Status: ON HOLD | COMMUNITY
End: 2018-12-06

## 2018-12-03 RX ORDER — DOCUSATE SODIUM 100 MG/1
100 CAPSULE, LIQUID FILLED ORAL 2 TIMES DAILY PRN
Status: ON HOLD | COMMUNITY
End: 2018-12-14 | Stop reason: HOSPADM

## 2018-12-03 RX ADMIN — IOPAMIDOL 100 ML: 612 INJECTION, SOLUTION INTRAVENOUS at 17:31

## 2018-12-03 RX ADMIN — METHYLNALTREXONE BROMIDE 12 MG: 12 INJECTION, SOLUTION SUBCUTANEOUS at 19:37

## 2018-12-03 RX ADMIN — METHYLNALTREXONE BROMIDE 16 MG: 12 INJECTION, SOLUTION SUBCUTANEOUS at 21:39

## 2018-12-03 RX ADMIN — Medication 10 ML: at 17:35

## 2018-12-03 ASSESSMENT — ENCOUNTER SYMPTOMS
COLOR CHANGE: 0
VOMITING: 0
EYE DISCHARGE: 0
RHINORRHEA: 0
ABDOMINAL PAIN: 1
DIARRHEA: 0
ANAL BLEEDING: 0
FACIAL SWELLING: 0
CONSTIPATION: 1
BLOOD IN STOOL: 0
SHORTNESS OF BREATH: 0

## 2018-12-03 NOTE — ED PROVIDER NOTES
noted above the remainder of the review of systems was reviewed and negative. 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)          SURGICALHISTORY       Past Surgical History:   Procedure Laterality Date    CHOLECYSTECTOMY      GASTRIC BYPASS SURGERY N/A     KIDNEY REMOVAL Right          CURRENT MEDICATIONS       Previous Medications    AMPHETAMINE-DEXTROAMPHETAMINE (ADDERALL) 20 MG TABLET    Take 1 tab PO BID    CABOZANTINIB S-MALATE (CABOMETYX) 60 MG TABS    Take 60 mg by mouth daily    DEXAMETHASONE (DECADRON) 2 MG TABLET    Take 2 mg by mouth nightly    DEXAMETHASONE (DECADRON) 4 MG TABLET    Take 4 mg by mouth daily (with breakfast)     DOCUSATE SODIUM (COLACE) 100 MG CAPSULE    Take 100 mg by mouth 2 times daily as needed for Constipation    ERGOCALCIFEROL (ERGOCALCIFEROL) 39043 UNITS CAPSULE    Take 50,000 Units by mouth once a week Mondays    FENTANYL (DURAGESIC) 75 MCG/HR    Place 1 patch onto the skin every 72 hours. Alondra Lozano FOLIC ACID (FOLVITE) 1 MG TABLET    TK 1 T PO QD    GABAPENTIN (NEURONTIN) 800 MG TABLET    Take 800 mg by mouth 3 times daily. .    INSULIN GLARGINE (BASAGLAR KWIKPEN) 100 UNIT/ML INJECTION PEN    Inject 10 Units into the skin nightly    INSULIN LISPRO (HUMALOG) 100 UNIT/ML INJECTION VIAL    Inject 4 Units into the skin 3 times daily (before meals)    IPRATROPIUM-ALBUTEROL (DUONEB) 0.5-2.5 (3) MG/3ML SOLN NEBULIZER SOLUTION    Inhale 1 vial into the lungs every 2 hours as needed for Shortness of Breath    LACTULOSE 20 GM/30ML SOLN    Take 30 mLs by mouth 3 times daily    LEVOTHYROXINE (SYNTHROID) 50 MCG TABLET    50mcg daily    LINACLOTIDE (LINZESS) 145 MCG CAPSULE    Take 290 mcg by mouth every morning (before breakfast)     MELOXICAM

## 2018-12-03 NOTE — ED NOTES
Bed: 16  Expected date: 12/3/18  Expected time: 4:16 PM  Means of arrival: [facility]  Comments:  48 m - [facility] - constipation x1 month     Simón Cerrato RN  12/03/18 6246

## 2018-12-04 ENCOUNTER — OFFICE VISIT (OUTPATIENT)
Dept: PALLATIVE CARE | Age: 53
End: 2018-12-04
Payer: COMMERCIAL

## 2018-12-04 ENCOUNTER — APPOINTMENT (OUTPATIENT)
Dept: GENERAL RADIOLOGY | Age: 53
DRG: 542 | End: 2018-12-04
Payer: COMMERCIAL

## 2018-12-04 ENCOUNTER — HOSPITAL ENCOUNTER (EMERGENCY)
Age: 53
Discharge: HOME | DRG: 542 | End: 2018-12-04
Payer: COMMERCIAL

## 2018-12-04 VITALS
RESPIRATION RATE: 20 BRPM | DIASTOLIC BLOOD PRESSURE: 71 MMHG | TEMPERATURE: 97.9 F | WEIGHT: 315 LBS | SYSTOLIC BLOOD PRESSURE: 114 MMHG | HEART RATE: 82 BPM | OXYGEN SATURATION: 93 % | BODY MASS INDEX: 40.43 KG/M2 | HEIGHT: 74 IN

## 2018-12-04 VITALS — DIASTOLIC BLOOD PRESSURE: 76 MMHG | SYSTOLIC BLOOD PRESSURE: 122 MMHG | HEART RATE: 76 BPM | RESPIRATION RATE: 18 BRPM

## 2018-12-04 DIAGNOSIS — G89.3 NEOPLASM RELATED PAIN: ICD-10-CM

## 2018-12-04 DIAGNOSIS — S81.811A NONINFECTED SKIN TEAR OF LOWER EXTREMITY, RIGHT, INITIAL ENCOUNTER: Primary | ICD-10-CM

## 2018-12-04 DIAGNOSIS — K59.01 SLOW TRANSIT CONSTIPATION: ICD-10-CM

## 2018-12-04 DIAGNOSIS — T07.XXXA MULTIPLE CONTUSIONS: ICD-10-CM

## 2018-12-04 DIAGNOSIS — C64.9 METASTATIC RENAL CELL CARCINOMA, UNSPECIFIED LATERALITY (HCC): ICD-10-CM

## 2018-12-04 DIAGNOSIS — Z51.5 PALLIATIVE CARE ENCOUNTER: ICD-10-CM
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
## 2018-12-04 DIAGNOSIS — R39.11 URINARY HESITANCY: Primary | ICD-10-CM

## 2018-12-04 LAB
ANION GAP SERPL CALCULATED.3IONS-SCNC: 12 MEQ/L (ref 7–13)
APTT: 29.9 SEC (ref 21.6–35.4)
BASOPHILS ABSOLUTE: 0 K/UL (ref 0–0.2)
BASOPHILS RELATIVE PERCENT: 0.5 %
BUN BLDV-MCNC: 33 MG/DL (ref 6–20)
CALCIUM SERPL-MCNC: 9.4 MG/DL (ref 8.6–10.2)
CHLORIDE BLD-SCNC: 90 MEQ/L (ref 98–107)
CO2: 36 MEQ/L (ref 22–29)
CREAT SERPL-MCNC: 1.01 MG/DL (ref 0.7–1.2)
EOSINOPHILS ABSOLUTE: 0 K/UL (ref 0–0.7)
EOSINOPHILS RELATIVE PERCENT: 0.5 %
GFR AFRICAN AMERICAN: >60
GFR AFRICAN AMERICAN: >60
GFR NON-AFRICAN AMERICAN: >60
GFR NON-AFRICAN AMERICAN: >60
GLUCOSE BLD-MCNC: 297 MG/DL (ref 74–109)
HCT VFR BLD CALC: 45 % (ref 42–52)
HEMOGLOBIN: 14.5 G/DL (ref 14–18)
INR BLD: 2
LYMPHOCYTES ABSOLUTE: 1.1 K/UL (ref 1–4.8)
LYMPHOCYTES RELATIVE PERCENT: 18.4 %
MCH RBC QN AUTO: 25.8 PG (ref 27–31.3)
MCHC RBC AUTO-ENTMCNC: 32.2 % (ref 33–37)
MCV RBC AUTO: 80.2 FL (ref 80–100)
MONOCYTES ABSOLUTE: 0.3 K/UL (ref 0.2–0.8)
MONOCYTES RELATIVE PERCENT: 5.8 %
NEUTROPHILS ABSOLUTE: 4.5 K/UL (ref 1.4–6.5)
NEUTROPHILS RELATIVE PERCENT: 74.8 %
PDW BLD-RTO: 17.3 % (ref 11.5–14.5)
PERFORMED ON: NORMAL
PLATELET # BLD: 92 K/UL (ref 130–400)
POC CREATININE: 1 MG/DL (ref 0.9–1.3)
POC SAMPLE TYPE: NORMAL
POTASSIUM SERPL-SCNC: 3.5 MEQ/L (ref 3.5–5.1)
PROTHROMBIN TIME: 20 SEC (ref 9–11.5)
RBC # BLD: 5.61 M/UL (ref 4.7–6.1)
SODIUM BLD-SCNC: 138 MEQ/L (ref 132–144)
WBC # BLD: 6 K/UL (ref 4.8–10.8)

## 2018-12-04 PROCEDURE — 80048 BASIC METABOLIC PNL TOTAL CA: CPT

## 2018-12-04 PROCEDURE — 2500000003 HC RX 250 WO HCPCS: Performed by: PHYSICIAN ASSISTANT

## 2018-12-04 PROCEDURE — 85610 PROTHROMBIN TIME: CPT

## 2018-12-04 PROCEDURE — 99284 EMERGENCY DEPT VISIT MOD MDM: CPT

## 2018-12-04 PROCEDURE — 85730 THROMBOPLASTIN TIME PARTIAL: CPT

## 2018-12-04 PROCEDURE — 73590 X-RAY EXAM OF LOWER LEG: CPT

## 2018-12-04 PROCEDURE — 6370000000 HC RX 637 (ALT 250 FOR IP): Performed by: PHYSICIAN ASSISTANT

## 2018-12-04 PROCEDURE — 85025 COMPLETE CBC W/AUTO DIFF WBC: CPT

## 2018-12-04 PROCEDURE — 36415 COLL VENOUS BLD VENIPUNCTURE: CPT

## 2018-12-04 PROCEDURE — 99308 SBSQ NF CARE LOW MDM 20: CPT | Performed by: NURSE PRACTITIONER

## 2018-12-04 RX ORDER — DIAPER,BRIEF,INFANT-TODD,DISP
EACH MISCELLANEOUS ONCE
Status: COMPLETED | OUTPATIENT
Start: 2018-12-04 | End: 2018-12-04

## 2018-12-04 RX ORDER — SULFAMETHOXAZOLE AND TRIMETHOPRIM 800; 160 MG/1; MG/1
1 TABLET ORAL ONCE
Status: DISCONTINUED | OUTPATIENT
Start: 2018-12-04 | End: 2018-12-04

## 2018-12-04 RX ORDER — TAMSULOSIN HYDROCHLORIDE 0.4 MG/1
0.4 CAPSULE ORAL DAILY
Qty: 30 CAPSULE | Refills: 3 | Status: ON HOLD | OUTPATIENT
Start: 2018-12-04 | End: 2018-12-06

## 2018-12-04 RX ADMIN — BACITRACIN ZINC 1 G: 500 OINTMENT TOPICAL at 16:08

## 2018-12-04 RX ADMIN — Medication: at 16:53

## 2018-12-04 ASSESSMENT — ENCOUNTER SYMPTOMS
APNEA: 0
PHOTOPHOBIA: 0
COLOR CHANGE: 1
ABDOMINAL DISTENTION: 0
BACK PAIN: 0
ABDOMINAL PAIN: 1
COUGH: 0
CHOKING: 0
NAUSEA: 0
VOICE CHANGE: 0
SHORTNESS OF BREATH: 0
EYE DISCHARGE: 0
BLOOD IN STOOL: 0
ANAL BLEEDING: 0
VOMITING: 0

## 2018-12-04 ASSESSMENT — PAIN DESCRIPTION - PAIN TYPE: TYPE: ACUTE PAIN

## 2018-12-04 ASSESSMENT — PAIN DESCRIPTION - LOCATION: LOCATION: LEG

## 2018-12-04 ASSESSMENT — PAIN DESCRIPTION - ORIENTATION: ORIENTATION: RIGHT

## 2018-12-04 ASSESSMENT — PAIN SCALES - GENERAL: PAINLEVEL_OUTOF10: 4

## 2018-12-04 ASSESSMENT — PAIN DESCRIPTION - DESCRIPTORS: DESCRIPTORS: SORE

## 2018-12-04 ASSESSMENT — PAIN SCALES - WONG BAKER: WONGBAKER_NUMERICALRESPONSE: 2

## 2018-12-04 NOTE — ED TRIAGE NOTES
Arrived via cart per [facility] from home. Was coming home from [employer] and fell up stairs when going into house. States \"My leg buckled\". Large skin tear right shin with mod amt dry dark red drainage, dressing intact.

## 2018-12-04 NOTE — ED NOTES
Report called to [facility].   ETA [address] [facility] [place][note 2] Heather Womack RN  12/03/18 6594

## 2018-12-04 NOTE — PROGRESS NOTES
Negative for cough, chest tightness, shortness of breath and wheezing. Cardiovascular: Positive for leg swelling. Negative for chest pain and palpitations. Gastrointestinal: Positive for constipation. Negative for abdominal distention, abdominal pain, diarrhea, nausea and vomiting. Genitourinary: Positive for difficulty urinating. Negative for dysuria, flank pain, frequency and hematuria. Musculoskeletal: Positive for back pain and gait problem. Skin: Negative for color change and rash. Neurological: Positive for weakness (BLE and right hand) and numbness (right hand). Negative for dizziness, tremors, seizures, syncope, speech difficulty, light-headedness and headaches. Hematological: Negative for adenopathy. Psychiatric/Behavioral: Positive for dysphoric mood. Negative for agitation, confusion, hallucinations, sleep disturbance and suicidal ideas. The patient is not nervous/anxious. Objective:   /76   Pulse 76   Resp 18     Physical Exam   Constitutional: He is oriented to person, place, and time. No distress. Fatigued and ill appearing   HENT:   Head: Normocephalic and atraumatic. Mouth/Throat: Oropharynx is clear and moist.   Neck: Neck supple. No JVD present. Cardiovascular: Normal rate and regular rhythm. Pulmonary/Chest: Effort normal and breath sounds normal. No respiratory distress. He has no wheezes. He has no rales. Abdominal: Soft. Bowel sounds are normal. He exhibits no distension. There is no tenderness. There is no rebound and no guarding. Musculoskeletal: He exhibits no edema or deformity. Lymphadenopathy:     He has no cervical adenopathy. Neurological: He is alert and oriented to person, place, and time. No cranial nerve deficit. Skin: Skin is warm and dry. No rash noted. He is not diaphoretic. Psychiatric: He has a normal mood and affect. Assessment and Plan:      1. Urinary hesitancy  - Flomax 0.4mg daily    2.  Slow transit

## 2018-12-04 NOTE — ED NOTES
Assisted patient back to bed. Patient urinated but unable to have a bowel  Movement.       Merlyn Jerry RN  12/03/18 4808

## 2018-12-04 NOTE — ED PROVIDER NOTES
extremity, right, initial encounter:   Diagnosis management comments: Concern about cellulitis has he's had it before. He does have cancer takes high-dose steroids she is aware of the elevated glucose does not have diabetes. Patient walks with a walker. leon Wilkins. Who examined wound. Pt dressing now sanguinous soaked. At 1712 hrs. patient has new dressing was seen approximately placed noserosanguineous staining of new dressing       Amount and/or Complexity of Data Reviewed  Clinical lab tests: reviewed and ordered  Tests in the radiology section of CPT®: reviewed and ordered  Discuss the patient with other providers: yes        CRITICAL CARE TIME       CONSULTS:  None    PROCEDURES:  Unless otherwise noted below, none     Procedures    FINAL IMPRESSION      1. Noninfected skin tear of lower extremity, right, initial encounter    2. Multiple contusions    3. Fall, initial encounter          DISPOSITION/PLAN   DISPOSITION        PATIENT REFERRED TO:  Ainsley Espinal MD  [place] [address].   [address]  [place] [address]    Schedule an appointment as soon as possible for a visit       [facility]) ED  [address] [address]  [phone]    If symptoms worsen      DISCHARGE MEDICATIONS:  New Prescriptions    No medications on file          (Please note that portions of this note were completed with a voice recognition program.  Efforts were made to edit the dictations but occasionally words are mis-transcribed.)    Bruno Bailey PA-C (electronically signed)  Attending Emergency Physician         Bruno Bailey PA-C  12/04/18 1958

## 2018-12-05 ENCOUNTER — HOSPITAL ENCOUNTER (INPATIENT)
Age: 53
LOS: 7 days | Discharge: NURSING, REHAB OR LONG-TERM CARE | DRG: 542 | End: 2018-12-14
Attending: INTERNAL MEDICINE | Admitting: INTERNAL MEDICINE
Payer: COMMERCIAL

## 2018-12-05 DIAGNOSIS — G89.3 NEOPLASM RELATED PAIN: ICD-10-CM

## 2018-12-05 DIAGNOSIS — R26.2 UNABLE TO AMBULATE: ICD-10-CM

## 2018-12-05 DIAGNOSIS — R53.1 GENERALIZED WEAKNESS: Primary | ICD-10-CM

## 2018-12-05 DIAGNOSIS — G89.3 NEOPLASM RELATED PAIN: Primary | ICD-10-CM

## 2018-12-05 DIAGNOSIS — Z51.5 PALLIATIVE CARE PATIENT: ICD-10-CM

## 2018-12-05 DIAGNOSIS — E86.0 DEHYDRATION: ICD-10-CM

## 2018-12-05 DIAGNOSIS — C64.9 METASTATIC RENAL CELL CARCINOMA, UNSPECIFIED LATERALITY (HCC): ICD-10-CM

## 2018-12-05 DIAGNOSIS — C64.1 RENAL CELL CARCINOMA OF RIGHT KIDNEY (HCC): ICD-10-CM

## 2018-12-05 DIAGNOSIS — G89.3 CHRONIC PAIN DUE TO NEOPLASM: ICD-10-CM

## 2018-12-05 PROBLEM — Z99.89 OSA ON CPAP: Status: ACTIVE | Noted: 2018-12-05

## 2018-12-05 PROBLEM — J44.9 COPD (CHRONIC OBSTRUCTIVE PULMONARY DISEASE) (HCC): Status: ACTIVE | Noted: 2018-12-05

## 2018-12-05 PROBLEM — Z51.12 ENCOUNTER FOR ANTINEOPLASTIC IMMUNOTHERAPY: Status: ACTIVE | Noted: 2018-12-05

## 2018-12-05 PROBLEM — R20.2 PARESTHESIA OF RIGHT UPPER EXTREMITY: Status: ACTIVE | Noted: 2018-12-05

## 2018-12-05 PROBLEM — E66.01 MORBID OBESITY (HCC): Status: ACTIVE | Noted: 2018-12-05

## 2018-12-05 PROBLEM — Z90.5 STATUS POST NEPHRECTOMY: Status: ACTIVE | Noted: 2018-12-05

## 2018-12-05 PROBLEM — M50.123 CERVICAL DISC DISORDER AT C6-C7 LEVEL WITH RADICULOPATHY: Status: ACTIVE | Noted: 2018-12-05

## 2018-12-05 PROBLEM — R29.898 WEAKNESS OF BOTH LOWER EXTREMITIES: Status: ACTIVE | Noted: 2018-12-05

## 2018-12-05 PROBLEM — M54.12: Status: ACTIVE | Noted: 2018-12-05

## 2018-12-05 PROBLEM — Z92.3 S/P RADIOTHERAPY: Status: ACTIVE | Noted: 2018-12-05

## 2018-12-05 PROBLEM — Z98.84 S/P BARIATRIC SURGERY: Status: ACTIVE | Noted: 2018-12-05

## 2018-12-05 PROBLEM — Z86.711 HISTORY OF PULMONARY EMBOLISM: Status: ACTIVE | Noted: 2018-12-05

## 2018-12-05 PROBLEM — D49.9: Status: ACTIVE | Noted: 2018-12-05

## 2018-12-05 PROBLEM — G47.33 OSA ON CPAP: Status: ACTIVE | Noted: 2018-12-05

## 2018-12-05 LAB
ALBUMIN SERPL-MCNC: 3.2 G/DL (ref 3.9–4.9)
ALP BLD-CCNC: 91 U/L (ref 35–104)
ALT SERPL-CCNC: 53 U/L (ref 0–41)
ANION GAP SERPL CALCULATED.3IONS-SCNC: 10 MEQ/L (ref 7–13)
ANISOCYTOSIS: ABNORMAL
APTT: 33.4 SEC (ref 21.6–35.4)
AST SERPL-CCNC: 34 U/L (ref 0–40)
ATYPICAL LYMPHOCYTE RELATIVE PERCENT: 4 %
BANDED NEUTROPHILS RELATIVE PERCENT: 1 %
BASOPHILS ABSOLUTE: 0 K/UL (ref 0–0.2)
BASOPHILS RELATIVE PERCENT: 0.3 %
BILIRUB SERPL-MCNC: 0.7 MG/DL (ref 0–1.2)
BILIRUBIN URINE: NEGATIVE
BLOOD, URINE: NEGATIVE
BUN BLDV-MCNC: 32 MG/DL (ref 6–20)
CALCIUM SERPL-MCNC: 8.9 MG/DL (ref 8.6–10.2)
CHLORIDE BLD-SCNC: 92 MEQ/L (ref 98–107)
CLARITY: CLEAR
CO2: 33 MEQ/L (ref 22–29)
COLOR: YELLOW
CREAT SERPL-MCNC: 1.09 MG/DL (ref 0.7–1.2)
EKG ATRIAL RATE: 84 BPM
EKG P AXIS: 12 DEGREES
EKG P-R INTERVAL: 174 MS
EKG Q-T INTERVAL: 384 MS
EKG QRS DURATION: 100 MS
EKG QTC CALCULATION (BAZETT): 453 MS
EKG R AXIS: 133 DEGREES
EKG T AXIS: 24 DEGREES
EKG VENTRICULAR RATE: 84 BPM
EOSINOPHILS ABSOLUTE: 0 K/UL (ref 0–0.7)
EOSINOPHILS RELATIVE PERCENT: 1.2 %
GFR AFRICAN AMERICAN: >60
GFR NON-AFRICAN AMERICAN: >60
GLOBULIN: 2.7 G/DL (ref 2.3–3.5)
GLUCOSE BLD-MCNC: 282 MG/DL (ref 74–109)
GLUCOSE URINE: 500 MG/DL
HCT VFR BLD CALC: 43 % (ref 42–52)
HEMOGLOBIN: 14.2 G/DL (ref 14–18)
INR BLD: 3.1
KETONES, URINE: NEGATIVE MG/DL
LACTIC ACID: 2 MMOL/L (ref 0.5–2.2)
LEUKOCYTE ESTERASE, URINE: NEGATIVE
LYMPHOCYTES ABSOLUTE: 1.2 K/UL (ref 1–4.8)
LYMPHOCYTES RELATIVE PERCENT: 19 %
MAGNESIUM: 1.9 MG/DL (ref 1.7–2.3)
MCH RBC QN AUTO: 26.1 PG (ref 27–31.3)
MCHC RBC AUTO-ENTMCNC: 32.9 % (ref 33–37)
MCV RBC AUTO: 79.5 FL (ref 80–100)
MICROCYTES: ABNORMAL
MONOCYTES ABSOLUTE: 0.2 K/UL (ref 0.2–0.8)
MONOCYTES RELATIVE PERCENT: 3.9 %
NEUTROPHILS ABSOLUTE: 3.7 K/UL (ref 1.4–6.5)
NEUTROPHILS RELATIVE PERCENT: 72 %
NITRITE, URINE: NEGATIVE
PDW BLD-RTO: 17 % (ref 11.5–14.5)
PH UA: 5.5 (ref 5–9)
PLATELET # BLD: 80 K/UL (ref 130–400)
PLATELET SLIDE REVIEW: ABNORMAL
POIKILOCYTES: ABNORMAL
POTASSIUM SERPL-SCNC: 3.8 MEQ/L (ref 3.5–5.1)
PROTEIN UA: NEGATIVE MG/DL
PROTHROMBIN TIME: 30 SEC (ref 9–11.5)
RBC # BLD: 5.41 M/UL (ref 4.7–6.1)
SLIDE REVIEW: ABNORMAL
SMUDGE CELLS: 1.9
SODIUM BLD-SCNC: 135 MEQ/L (ref 132–144)
SPECIFIC GRAVITY UA: 1.01 (ref 1–1.03)
TOTAL CK: 46 U/L (ref 0–190)
TOTAL PROTEIN: 5.9 G/DL (ref 6.4–8.1)
TROPONIN: <0.01 NG/ML (ref 0–0.01)
URINE REFLEX TO CULTURE: ABNORMAL
UROBILINOGEN, URINE: 1 E.U./DL
WBC # BLD: 5 K/UL (ref 4.8–10.8)

## 2018-12-05 PROCEDURE — 85730 THROMBOPLASTIN TIME PARTIAL: CPT

## 2018-12-05 PROCEDURE — 81003 URINALYSIS AUTO W/O SCOPE: CPT

## 2018-12-05 PROCEDURE — 84484 ASSAY OF TROPONIN QUANT: CPT

## 2018-12-05 PROCEDURE — 85610 PROTHROMBIN TIME: CPT

## 2018-12-05 PROCEDURE — 83735 ASSAY OF MAGNESIUM: CPT

## 2018-12-05 PROCEDURE — 2580000003 HC RX 258: Performed by: PHYSICIAN ASSISTANT

## 2018-12-05 PROCEDURE — G0378 HOSPITAL OBSERVATION PER HR: HCPCS

## 2018-12-05 PROCEDURE — 83605 ASSAY OF LACTIC ACID: CPT

## 2018-12-05 PROCEDURE — 36415 COLL VENOUS BLD VENIPUNCTURE: CPT

## 2018-12-05 PROCEDURE — 80053 COMPREHEN METABOLIC PANEL: CPT

## 2018-12-05 PROCEDURE — 93005 ELECTROCARDIOGRAM TRACING: CPT

## 2018-12-05 PROCEDURE — 99285 EMERGENCY DEPT VISIT HI MDM: CPT

## 2018-12-05 PROCEDURE — 82550 ASSAY OF CK (CPK): CPT

## 2018-12-05 PROCEDURE — 85025 COMPLETE CBC W/AUTO DIFF WBC: CPT

## 2018-12-05 RX ORDER — MELOXICAM 7.5 MG/1
15 TABLET ORAL DAILY
Status: DISCONTINUED | OUTPATIENT
Start: 2018-12-06 | End: 2018-12-10

## 2018-12-05 RX ORDER — SODIUM CHLORIDE 0.9 % (FLUSH) 0.9 %
10 SYRINGE (ML) INJECTION EVERY 12 HOURS SCHEDULED
Status: DISCONTINUED | OUTPATIENT
Start: 2018-12-05 | End: 2018-12-14 | Stop reason: HOSPADM

## 2018-12-05 RX ORDER — FENTANYL 75 UG/H
1 PATCH TRANSDERMAL
Qty: 10 PATCH | Refills: 0 | Status: ON HOLD | OUTPATIENT
Start: 2018-12-05 | End: 2018-12-14

## 2018-12-05 RX ORDER — FENTANYL 75 UG/H
1 PATCH TRANSDERMAL
Status: DISCONTINUED | OUTPATIENT
Start: 2018-12-07 | End: 2018-12-14 | Stop reason: HOSPADM

## 2018-12-05 RX ORDER — ACETAMINOPHEN 325 MG/1
650 TABLET ORAL EVERY 4 HOURS PRN
Status: DISCONTINUED | OUTPATIENT
Start: 2018-12-05 | End: 2018-12-13

## 2018-12-05 RX ORDER — DOCUSATE SODIUM 100 MG/1
100 CAPSULE, LIQUID FILLED ORAL 2 TIMES DAILY
Status: DISCONTINUED | OUTPATIENT
Start: 2018-12-06 | End: 2018-12-14 | Stop reason: HOSPADM

## 2018-12-05 RX ORDER — LEVOTHYROXINE SODIUM 0.05 MG/1
50 TABLET ORAL DAILY
Status: DISCONTINUED | OUTPATIENT
Start: 2018-12-06 | End: 2018-12-14 | Stop reason: HOSPADM

## 2018-12-05 RX ORDER — FOLIC ACID 1 MG/1
1 TABLET ORAL DAILY
Status: DISCONTINUED | OUTPATIENT
Start: 2018-12-06 | End: 2018-12-14 | Stop reason: HOSPADM

## 2018-12-05 RX ORDER — IPRATROPIUM BROMIDE AND ALBUTEROL SULFATE 2.5; .5 MG/3ML; MG/3ML
1 SOLUTION RESPIRATORY (INHALATION)
Status: DISCONTINUED | OUTPATIENT
Start: 2018-12-05 | End: 2018-12-14 | Stop reason: HOSPADM

## 2018-12-05 RX ORDER — SODIUM CHLORIDE 0.9 % (FLUSH) 0.9 %
10 SYRINGE (ML) INJECTION PRN
Status: DISCONTINUED | OUTPATIENT
Start: 2018-12-05 | End: 2018-12-14 | Stop reason: HOSPADM

## 2018-12-05 RX ORDER — SODIUM CHLORIDE 9 MG/ML
INJECTION, SOLUTION INTRAVENOUS CONTINUOUS
Status: DISCONTINUED | OUTPATIENT
Start: 2018-12-05 | End: 2018-12-11 | Stop reason: ALTCHOICE

## 2018-12-05 RX ORDER — WARFARIN SODIUM 5 MG/1
10 TABLET ORAL DAILY
Status: DISCONTINUED | OUTPATIENT
Start: 2018-12-06 | End: 2018-12-14 | Stop reason: HOSPADM

## 2018-12-05 RX ORDER — GABAPENTIN 400 MG/1
800 CAPSULE ORAL 4 TIMES DAILY
Status: DISCONTINUED | OUTPATIENT
Start: 2018-12-06 | End: 2018-12-10

## 2018-12-05 RX ADMIN — SODIUM CHLORIDE: 9 INJECTION, SOLUTION INTRAVENOUS at 20:16

## 2018-12-05 ASSESSMENT — ENCOUNTER SYMPTOMS
TROUBLE SWALLOWING: 0
VOMITING: 0
ABDOMINAL PAIN: 0
APNEA: 0
EYE PAIN: 0
COLOR CHANGE: 0
ALLERGIC/IMMUNOLOGIC NEGATIVE: 1
CONSTIPATION: 1
NAUSEA: 0
DIARRHEA: 0
SHORTNESS OF BREATH: 0

## 2018-12-05 ASSESSMENT — PAIN DESCRIPTION - ORIENTATION: ORIENTATION: RIGHT

## 2018-12-05 ASSESSMENT — PAIN SCALES - GENERAL: PAINLEVEL_OUTOF10: 7

## 2018-12-05 ASSESSMENT — PAIN DESCRIPTION - PAIN TYPE: TYPE: CHRONIC PAIN

## 2018-12-05 ASSESSMENT — PAIN DESCRIPTION - LOCATION: LOCATION: ARM;NECK

## 2018-12-05 NOTE — ED PROVIDER NOTES
well-developed and well-nourished. No distress. HENT:   Head: Normocephalic and atraumatic. Mouth/Throat: No oropharyngeal exudate. Eyes: Pupils are equal, round, and reactive to light. Conjunctivae and EOM are normal. No scleral icterus. Neck: Normal range of motion. Neck supple. No tracheal deviation present. Cardiovascular: Normal rate, normal heart sounds and intact distal pulses. Pulmonary/Chest: Effort normal and breath sounds normal. No respiratory distress. Abdominal: Soft. Bowel sounds are normal. He exhibits no distension. Musculoskeletal: Normal range of motion. Neurological: He is alert and oriented to person, place, and time. No cranial nerve deficit. He exhibits normal muscle tone. Skin: Skin is warm and dry. No rash noted. He is not diaphoretic. No erythema. Psychiatric: He has a normal mood and affect. His behavior is normal. Judgment and thought content normal.   Nursing note and vitals reviewed.       DIAGNOSTIC RESULTS     EKG: All EKG's are interpreted by the Emergency Department Physician who either signs or Co-signsthis chart in the absence of a cardiologist.    NSR @ 84, no acute ST elevation    RADIOLOGY:   Non-plain filmimages such as CT, Ultrasound and MRI are read by the radiologist. Plain radiographic images are visualized and preliminarily interpreted by the emergency physician with the below findings:        Interpretation per the Radiologist below, if available at the time ofthis note:    No orders to display         ED BEDSIDE ULTRASOUND:   Performed by ED Physician - none    LABS:  Labs Reviewed   COMPREHENSIVE METABOLIC PANEL - Abnormal; Notable for the following:        Result Value    Chloride 92 (*)     CO2 33 (*)     Glucose 282 (*)     BUN 32 (*)     Total Protein 5.9 (*)     Alb 3.2 (*)     ALT 53 (*)     All other components within normal limits   CBC WITH AUTO DIFFERENTIAL - Abnormal; Notable for the following:     MCV 79.5 (*)     MCH 26.1 (*)     [facility] JOE  12/05/18 1911

## 2018-12-05 NOTE — ED TRIAGE NOTES
Patient is alert and oriented X4. Patient had amino therapy couple months ago and had to spend time at [facility] for rehab for strengthening. Patient was discharged from [facility] yesterday and fell. Patient was seen here yesterday and [facility]. Patient states he can't walk and has been able to ambulate.

## 2018-12-05 NOTE — TELEPHONE ENCOUNTER
Sharron Leggett called to say that he was discharged from the NH, and needs a refill of Fentanyl  TD patch 75 mcg/hr.

## 2018-12-06 LAB
ALBUMIN SERPL-MCNC: 3.1 G/DL (ref 3.9–4.9)
ALP BLD-CCNC: 87 U/L (ref 35–104)
ALT SERPL-CCNC: 45 U/L (ref 0–41)
ANION GAP SERPL CALCULATED.3IONS-SCNC: 11 MEQ/L (ref 7–13)
AST SERPL-CCNC: 20 U/L (ref 0–40)
BILIRUB SERPL-MCNC: 1 MG/DL (ref 0–1.2)
BILIRUBIN DIRECT: 0.3 MG/DL (ref 0–0.3)
BILIRUBIN, INDIRECT: 0.7 MG/DL (ref 0–0.6)
BUN BLDV-MCNC: 28 MG/DL (ref 6–20)
CALCIUM SERPL-MCNC: 8.9 MG/DL (ref 8.6–10.2)
CHLORIDE BLD-SCNC: 94 MEQ/L (ref 98–107)
CHOLESTEROL, TOTAL: 117 MG/DL (ref 0–199)
CO2: 34 MEQ/L (ref 22–29)
CREAT SERPL-MCNC: 0.81 MG/DL (ref 0.7–1.2)
FOLATE: >20 NG/ML (ref 7.3–26.1)
GFR AFRICAN AMERICAN: >60
GFR NON-AFRICAN AMERICAN: >60
GLUCOSE BLD-MCNC: 228 MG/DL (ref 60–115)
GLUCOSE BLD-MCNC: 236 MG/DL (ref 74–109)
GLUCOSE BLD-MCNC: 252 MG/DL (ref 60–115)
GLUCOSE BLD-MCNC: 281 MG/DL (ref 60–115)
GLUCOSE BLD-MCNC: 284 MG/DL (ref 60–115)
GLUCOSE BLD-MCNC: 332 MG/DL (ref 60–115)
HBA1C MFR BLD: 7.2 % (ref 4.8–5.9)
HCT VFR BLD CALC: 40.6 % (ref 42–52)
HDLC SERPL-MCNC: 79 MG/DL (ref 40–59)
HEMOGLOBIN: 13.3 G/DL (ref 14–18)
LDL CHOLESTEROL CALCULATED: 28 MG/DL (ref 0–129)
MAGNESIUM: 2 MG/DL (ref 1.7–2.3)
MCH RBC QN AUTO: 26.2 PG (ref 27–31.3)
MCHC RBC AUTO-ENTMCNC: 32.8 % (ref 33–37)
MCV RBC AUTO: 79.9 FL (ref 80–100)
PDW BLD-RTO: 16.8 % (ref 11.5–14.5)
PERFORMED ON: ABNORMAL
PHOSPHORUS: 4.2 MG/DL (ref 2.5–4.5)
PLATELET # BLD: 87 K/UL (ref 130–400)
POTASSIUM REFLEX MAGNESIUM: 3.5 MEQ/L (ref 3.5–5.1)
RBC # BLD: 5.09 M/UL (ref 4.7–6.1)
SODIUM BLD-SCNC: 139 MEQ/L (ref 132–144)
TOTAL PROTEIN: 5.5 G/DL (ref 6.4–8.1)
TRIGL SERPL-MCNC: 50 MG/DL (ref 0–200)
TSH SERPL DL<=0.05 MIU/L-ACNC: 3.04 UIU/ML (ref 0.27–4.2)
VITAMIN B-12: 446 PG/ML (ref 232–1245)
WBC # BLD: 4.7 K/UL (ref 4.8–10.8)

## 2018-12-06 PROCEDURE — G8988 SELF CARE GOAL STATUS: HCPCS

## 2018-12-06 PROCEDURE — 80048 BASIC METABOLIC PNL TOTAL CA: CPT

## 2018-12-06 PROCEDURE — 2580000003 HC RX 258: Performed by: INTERNAL MEDICINE

## 2018-12-06 PROCEDURE — 96372 THER/PROPH/DIAG INJ SC/IM: CPT

## 2018-12-06 PROCEDURE — G8987 SELF CARE CURRENT STATUS: HCPCS

## 2018-12-06 PROCEDURE — 6360000002 HC RX W HCPCS: Performed by: INTERNAL MEDICINE

## 2018-12-06 PROCEDURE — 83036 HEMOGLOBIN GLYCOSYLATED A1C: CPT

## 2018-12-06 PROCEDURE — 97535 SELF CARE MNGMENT TRAINING: CPT

## 2018-12-06 PROCEDURE — G0378 HOSPITAL OBSERVATION PER HR: HCPCS

## 2018-12-06 PROCEDURE — 82607 VITAMIN B-12: CPT

## 2018-12-06 PROCEDURE — 97167 OT EVAL HIGH COMPLEX 60 MIN: CPT

## 2018-12-06 PROCEDURE — 96374 THER/PROPH/DIAG INJ IV PUSH: CPT

## 2018-12-06 PROCEDURE — G8981 BODY POS CURRENT STATUS: HCPCS

## 2018-12-06 PROCEDURE — 84100 ASSAY OF PHOSPHORUS: CPT

## 2018-12-06 PROCEDURE — 82746 ASSAY OF FOLIC ACID SERUM: CPT

## 2018-12-06 PROCEDURE — 96376 TX/PRO/DX INJ SAME DRUG ADON: CPT

## 2018-12-06 PROCEDURE — 84443 ASSAY THYROID STIM HORMONE: CPT

## 2018-12-06 PROCEDURE — 36415 COLL VENOUS BLD VENIPUNCTURE: CPT

## 2018-12-06 PROCEDURE — C9113 INJ PANTOPRAZOLE SODIUM, VIA: HCPCS | Performed by: INTERNAL MEDICINE

## 2018-12-06 PROCEDURE — 94664 DEMO&/EVAL PT USE INHALER: CPT

## 2018-12-06 PROCEDURE — 83735 ASSAY OF MAGNESIUM: CPT

## 2018-12-06 PROCEDURE — G8982 BODY POS GOAL STATUS: HCPCS

## 2018-12-06 PROCEDURE — 2580000003 HC RX 258: Performed by: PHYSICIAN ASSISTANT

## 2018-12-06 PROCEDURE — 2700000000 HC OXYGEN THERAPY PER DAY

## 2018-12-06 PROCEDURE — 6370000000 HC RX 637 (ALT 250 FOR IP): Performed by: INTERNAL MEDICINE

## 2018-12-06 PROCEDURE — 80061 LIPID PANEL: CPT

## 2018-12-06 PROCEDURE — 80076 HEPATIC FUNCTION PANEL: CPT

## 2018-12-06 PROCEDURE — 97162 PT EVAL MOD COMPLEX 30 MIN: CPT

## 2018-12-06 PROCEDURE — 96375 TX/PRO/DX INJ NEW DRUG ADDON: CPT

## 2018-12-06 PROCEDURE — 85027 COMPLETE CBC AUTOMATED: CPT

## 2018-12-06 RX ORDER — DEXTROSE MONOHYDRATE 25 G/50ML
12.5 INJECTION, SOLUTION INTRAVENOUS PRN
Status: DISCONTINUED | OUTPATIENT
Start: 2018-12-06 | End: 2018-12-14 | Stop reason: HOSPADM

## 2018-12-06 RX ORDER — DEXTROSE MONOHYDRATE 50 MG/ML
100 INJECTION, SOLUTION INTRAVENOUS PRN
Status: DISCONTINUED | OUTPATIENT
Start: 2018-12-06 | End: 2018-12-14 | Stop reason: HOSPADM

## 2018-12-06 RX ORDER — 0.9 % SODIUM CHLORIDE 0.9 %
10 VIAL (ML) INJECTION DAILY
Status: DISCONTINUED | OUTPATIENT
Start: 2018-12-06 | End: 2018-12-14 | Stop reason: HOSPADM

## 2018-12-06 RX ORDER — MORPHINE SULFATE 4 MG/ML
4 INJECTION, SOLUTION INTRAMUSCULAR; INTRAVENOUS EVERY 4 HOURS PRN
Status: DISPENSED | OUTPATIENT
Start: 2018-12-06 | End: 2018-12-07

## 2018-12-06 RX ORDER — POTASSIUM CHLORIDE 20 MEQ/1
40 TABLET, EXTENDED RELEASE ORAL EVERY 4 HOURS
Status: COMPLETED | OUTPATIENT
Start: 2018-12-06 | End: 2018-12-07

## 2018-12-06 RX ORDER — DEXTROAMPHETAMINE SACCHARATE, AMPHETAMINE ASPARTATE, DEXTROAMPHETAMINE SULFATE AND AMPHETAMINE SULFATE 2.5; 2.5; 2.5; 2.5 MG/1; MG/1; MG/1; MG/1
20 TABLET ORAL 3 TIMES DAILY
Status: DISCONTINUED | OUTPATIENT
Start: 2018-12-06 | End: 2018-12-10

## 2018-12-06 RX ORDER — INSULIN GLARGINE 100 [IU]/ML
10 INJECTION, SOLUTION SUBCUTANEOUS NIGHTLY
Status: DISCONTINUED | OUTPATIENT
Start: 2018-12-06 | End: 2018-12-14 | Stop reason: HOSPADM

## 2018-12-06 RX ORDER — POLYETHYLENE GLYCOL 3350 17 G/17G
17 POWDER, FOR SOLUTION ORAL DAILY
Status: DISCONTINUED | OUTPATIENT
Start: 2018-12-06 | End: 2018-12-08

## 2018-12-06 RX ORDER — PANTOPRAZOLE SODIUM 40 MG/10ML
40 INJECTION, POWDER, LYOPHILIZED, FOR SOLUTION INTRAVENOUS DAILY
Status: DISCONTINUED | OUTPATIENT
Start: 2018-12-06 | End: 2018-12-14 | Stop reason: HOSPADM

## 2018-12-06 RX ORDER — CYANOCOBALAMIN 1000 UG/ML
1000 INJECTION INTRAMUSCULAR; SUBCUTANEOUS ONCE
Status: COMPLETED | OUTPATIENT
Start: 2018-12-06 | End: 2018-12-06

## 2018-12-06 RX ORDER — NICOTINE POLACRILEX 4 MG
15 LOZENGE BUCCAL PRN
Status: DISCONTINUED | OUTPATIENT
Start: 2018-12-06 | End: 2018-12-14 | Stop reason: HOSPADM

## 2018-12-06 RX ORDER — MORPHINE SULFATE 4 MG/ML
4 INJECTION, SOLUTION INTRAMUSCULAR; INTRAVENOUS EVERY 4 HOURS PRN
Status: DISCONTINUED | OUTPATIENT
Start: 2018-12-06 | End: 2018-12-06

## 2018-12-06 RX ORDER — ONDANSETRON 2 MG/ML
4 INJECTION INTRAMUSCULAR; INTRAVENOUS EVERY 4 HOURS PRN
Status: DISCONTINUED | OUTPATIENT
Start: 2018-12-06 | End: 2018-12-14 | Stop reason: HOSPADM

## 2018-12-06 RX ORDER — LORAZEPAM 2 MG/ML
1 INJECTION INTRAMUSCULAR
Status: ACTIVE | OUTPATIENT
Start: 2018-12-06 | End: 2018-12-06

## 2018-12-06 RX ADMIN — PANTOPRAZOLE SODIUM 40 MG: 40 INJECTION, POWDER, FOR SOLUTION INTRAVENOUS at 09:55

## 2018-12-06 RX ADMIN — DOCUSATE SODIUM 100 MG: 100 CAPSULE, LIQUID FILLED ORAL at 09:55

## 2018-12-06 RX ADMIN — HYDROCORTISONE SODIUM SUCCINATE 100 MG: 100 INJECTION, POWDER, FOR SOLUTION INTRAMUSCULAR; INTRAVENOUS at 09:55

## 2018-12-06 RX ADMIN — SODIUM CHLORIDE 100 ML/HR: 9 INJECTION, SOLUTION INTRAVENOUS at 22:23

## 2018-12-06 RX ADMIN — Medication 10 ML: at 09:59

## 2018-12-06 RX ADMIN — GABAPENTIN 800 MG: 400 CAPSULE ORAL at 17:45

## 2018-12-06 RX ADMIN — INSULIN GLARGINE 10 UNITS: 100 INJECTION, SOLUTION SUBCUTANEOUS at 22:13

## 2018-12-06 RX ADMIN — Medication 10 ML: at 09:57

## 2018-12-06 RX ADMIN — INSULIN LISPRO 8 UNITS: 100 INJECTION, SOLUTION INTRAVENOUS; SUBCUTANEOUS at 17:44

## 2018-12-06 RX ADMIN — HYDROCORTISONE SODIUM SUCCINATE 100 MG: 100 INJECTION, POWDER, FOR SOLUTION INTRAMUSCULAR; INTRAVENOUS at 17:50

## 2018-12-06 RX ADMIN — DEXTROAMPHETAMINE SACCHARATE, AMPHETAMINE ASPARTATE, DEXTROAMPHETAMINE SULFATE AND AMPHETAMINE SULFATE 20 MG: 2.5; 2.5; 2.5; 2.5 TABLET ORAL at 09:56

## 2018-12-06 RX ADMIN — HYDROCORTISONE SODIUM SUCCINATE 100 MG: 100 INJECTION, POWDER, FOR SOLUTION INTRAMUSCULAR; INTRAVENOUS at 03:00

## 2018-12-06 RX ADMIN — POTASSIUM CHLORIDE 40 MEQ: 20 TABLET, EXTENDED RELEASE ORAL at 23:57

## 2018-12-06 RX ADMIN — FOLIC ACID 1 MG: 1 TABLET ORAL at 09:56

## 2018-12-06 RX ADMIN — GABAPENTIN 800 MG: 400 CAPSULE ORAL at 09:56

## 2018-12-06 RX ADMIN — POLYETHYLENE GLYCOL 3350 17 G: 17 POWDER, FOR SOLUTION ORAL at 09:55

## 2018-12-06 RX ADMIN — CYANOCOBALAMIN 1000 MCG: 1000 INJECTION, SOLUTION INTRAMUSCULAR; SUBCUTANEOUS at 23:59

## 2018-12-06 RX ADMIN — DEXTROAMPHETAMINE SACCHARATE, AMPHETAMINE ASPARTATE, DEXTROAMPHETAMINE SULFATE AND AMPHETAMINE SULFATE 20 MG: 2.5; 2.5; 2.5; 2.5 TABLET ORAL at 12:48

## 2018-12-06 RX ADMIN — ACETAMINOPHEN 650 MG: 325 TABLET ORAL at 04:29

## 2018-12-06 RX ADMIN — ONDANSETRON 4 MG: 2 INJECTION INTRAMUSCULAR; INTRAVENOUS at 11:32

## 2018-12-06 RX ADMIN — MUPIROCIN: 20 OINTMENT TOPICAL at 11:36

## 2018-12-06 RX ADMIN — GABAPENTIN 800 MG: 400 CAPSULE ORAL at 21:39

## 2018-12-06 RX ADMIN — DOCUSATE SODIUM 100 MG: 100 CAPSULE, LIQUID FILLED ORAL at 21:39

## 2018-12-06 RX ADMIN — GABAPENTIN 800 MG: 400 CAPSULE ORAL at 12:48

## 2018-12-06 RX ADMIN — SODIUM CHLORIDE: 9 INJECTION, SOLUTION INTRAVENOUS at 12:48

## 2018-12-06 RX ADMIN — MELOXICAM 15 MG: 7.5 TABLET ORAL at 09:56

## 2018-12-06 RX ADMIN — Medication 10 ML: at 21:40

## 2018-12-06 RX ADMIN — LEVOTHYROXINE SODIUM 50 MCG: 50 TABLET ORAL at 09:56

## 2018-12-06 RX ADMIN — INSULIN LISPRO 6 UNITS: 100 INJECTION, SOLUTION INTRAVENOUS; SUBCUTANEOUS at 12:54

## 2018-12-06 RX ADMIN — MORPHINE SULFATE 4 MG: 4 INJECTION, SOLUTION INTRAMUSCULAR; INTRAVENOUS at 11:32

## 2018-12-06 RX ADMIN — INSULIN LISPRO 6 UNITS: 100 INJECTION, SOLUTION INTRAVENOUS; SUBCUTANEOUS at 10:15

## 2018-12-06 ASSESSMENT — PAIN SCALES - GENERAL
PAINLEVEL_OUTOF10: 6
PAINLEVEL_OUTOF10: 6
PAINLEVEL_OUTOF10: 0
PAINLEVEL_OUTOF10: 5

## 2018-12-06 ASSESSMENT — ENCOUNTER SYMPTOMS
NAUSEA: 0
ABDOMINAL DISTENTION: 0
CONSTIPATION: 1
COUGH: 0
COLOR CHANGE: 0
WHEEZING: 0
BACK PAIN: 1
VOMITING: 0
CHEST TIGHTNESS: 0
SHORTNESS OF BREATH: 0
TROUBLE SWALLOWING: 0
ABDOMINAL PAIN: 0
DIARRHEA: 0

## 2018-12-06 NOTE — DISCHARGE INSTR - COC
Continuity of Care Form    Patient Name: Jenifer Fry   :  1965  MRN:  [id number]    Admit date:  2018  Discharge date:  18    Code Status Order: Full Code   Advance Directives:   [address] [facility] Documentation     Date/Time Healthcare Directive Type of Healthcare Directive Copy in [address] Agent's Name Healthcare Agent's Phone Number    18 [phone]  No, patient does not have an advance directive for healthcare treatment -- -- -- -- --          Admitting Physician:  Brisa Garner MD  PCP: Brisa Garner MD    Discharging Nurse: [facility] Unit/Room#: J371/H361-79  Discharging Unit Phone Number: [phone]    Emergency Contact:   Extended Emergency Contact Information  Primary Emergency Contact: Harvis Epley [address] Phone: [phone]  Relation: Spouse    Past Surgical History:  Past Surgical History:   Procedure Laterality Date    CHOLECYSTECTOMY      GASTRIC BYPASS SURGERY N/A     KIDNEY REMOVAL Right        Immunization History: There is no immunization history on file for this patient.     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    Rhomboid muscle strain S29.012A    Non morbid obesity due to excess calories E66.09    Strain of thoracic region S29.019A    Unable to ambulate R26.2    Renal cell carcinoma of right kidney (HCC) C64.1    Status post nephrectomy Z90.5    S/P radiotherapy Z92.3    Cervical radiculopathy due to neoplasm M54.12, D49.9    Cervical disc disorder at C6-C7 level with radiculopathy M50.123    Morbid obesity (HCC) E66.01    COPD (chronic obstructive pulmonary disease) ([facility]) J44.9    MARIBEL on CPAP G47.33, Z99.89    Paresthesia of right upper extremity R20.2    Weakness of both lower extremities R29.898    History of pulmonary embolism Z86.711    restirctions  Other Medical Equipment (for information only, NOT a DME order):  walker  Other Treatments: ***    Patient's personal belongings (please select all that are sent with patient):  Glasses    RN SIGNATURE:  Electronically signed by Olive Shepherd RN on 12/14/18 at 2:30 PM    CASE MANAGEMENT/SOCIAL WORK SECTION    Inpatient Status Date: ***    Readmission Risk Assessment Score:  Readmission Risk              Risk of Unplanned Readmission:        20           Discharging to Facility/ Agency   · Name: [facility]  · Address:  · Phone:[phone]  · Fax:    Dialysis Facility (if applicable)   · Name:  · Address:  · Dialysis Schedule:  · Phone:  · Fax:    / signature: Electronically signed by CECE Hoang on 12/6/18 at 11:54 AM    PHYSICIAN SECTION    Prognosis: {Prognosis:9966165020}    Condition at Discharge: Byron Mitchell Patient Condition:[id number]}    Rehab Potential (if transferring to Rehab): {Prognosis:3322183046}    Recommended Labs or Other Treatments After Discharge: ***    Physician Certification: I certify the above information and transfer of Mathieu Wilkes  is necessary for the continuing treatment of the diagnosis listed and that he requires for  30 days.      Update Admission H&P: {CHP DME Changes in CZQ:254415086}    PHYSICIAN SIGNATURE:  Electronically signed by Pankaj Calderon MD on 12/14/2018 at 11:41 AM

## 2018-12-06 NOTE — H&P
HISTORY:  The patient does not smoke, does not drink, does not  use any illicit drugs, and works as a . REVIEW OF SYSTEMS:  Denies any headache. No dizziness. No chest pain  except for the neck pain and the right upper extremity pain. There is  no shortness of breath at this time. There is no nausea, vomiting, or  diarrhea. Actually appears to be constipated and so was seen in the  emergency room 2 days prior for constipation, being on chronic pain  medication. Otherwise, there is no dysuria. No gross hematuria. No  melena. No bloody stools. Worsening bilateral lower extremity weakness  noted at this hospitalization. The rest of the 12-point review of  system has been negative. PHYSICAL EXAMINATION:  VITAL SIGNS:  Blood pressure is 136/79, pulse is about 86 per minute,  respiratory rate of 22, temperature is 98.3 degrees Fahrenheit, and O2  sat is 92% on room air. The patient presently is about 410 pounds,  initial weight. HEENT:  Pinkish conjunctivae. Anicteric sclerae. NECK:  There is no jugular venous distention. No lymphadenopathy. Neck  is supple. No bruit. HEART:  S1 and S2, regular rate and rhythm. LUNGS:  Good air entry. Clear breath sounds. No wheezing. No  crackles. ABDOMEN:  Globular and soft. Morbidly obese with previous open  cholecystectomy wound and also previous right nephrectomy wound. EXTREMITIES:  There is no evidence of pedal edema. Leg raising,  flexion, extension elicits no pain. Range of motion seems to be okay. ASSESSMENT:  1. Generalized weakness with worsening bilateral lower extremity  weakness.   2.  History of renal cell carcinoma with metastatic lesions to C6 and  C7, status post debulking surgery, status post high-intensity radiation  and status post recent immunotherapy which seems to have worsened and  progressed at this time and now presenting with right upper extremity  radiculopathy, paresthesia, and also now with bilateral lower LS spine with  bilateral lower extremity weakness. Neurological consultation will be  made with Dr. Huma Hinds and physical therapy and occupational therapy will  also be consulted. Otherwise, the rest of the home medications will be  continued. Pain medications will be supplemented with morphine 4 mg IV  push q.4 hours p.r.n. for now for severe pain and bowel management will  also be aggressively pursued. Will be maintained on Colace 100 mg and  MiraLAX 17 gm one p.o. daily and will be given magnesium citrate in 8  ounces of water p.o. x1 for having been constipated for the last 3 days. Otherwise, complete chemistry profile will also be obtained including  CBC, diff, chem-8, magnesium, phosphorus, lipid profile, liver function  tests, hemoglobin A1c, TSH, vitamin F33, folic acid, and serum iron  level will be checked. Daily PT and INR will also be monitored and  oncology consultation will be made as needed. Otherwise, rest of the  home medications will be continued as reviewed and plan of care  discussed with the patient as well as with the receiving RN. Time spent  in the care of this patient is about 1 hour and 7 minutes.       Patricia Oakley MD    D: 12/06/2018 1:18:17       T: 12/06/2018 6:21:58     [email or web address]  Job#: [id number]     Doc#: [id number]    CC:

## 2018-12-06 NOTE — CONSULTS
Inpatient consult to Neurology  Consult performed by: Pranav Dhaliwal ordered by: Erum Phelan      Cervical Myelopathy, partial brown sequard syndrome  Progressive  Radiation myelopathy and myopathy and PN  MRI t spine done EM, need results  LS spine today    Hong Salazar MD, Timmy Duque, American Board of Psychiatry & Neurology  Board Certified in Vascular Neurology  Board Certified in Neuromuscular Medicine  Certified in . [facility]

## 2018-12-06 NOTE — CARE COORDINATION
Met with pt who reports he is from home with his disabled wife and their children. He uses 3L 02 at night and PRN, has a C-pap, nebulizer and rollator. He was just discharged from [facility] and went home and hurt his leg. They had ordered [facility], but they had not yet started. Pt would like return to [facility] if indicated. PT/OT evals pending. Pre-cert would be needed. Initial referral made to [facility].

## 2018-12-07 ENCOUNTER — APPOINTMENT (OUTPATIENT)
Dept: INTERVENTIONAL RADIOLOGY/VASCULAR | Age: 53
DRG: 542 | End: 2018-12-07
Payer: COMMERCIAL

## 2018-12-07 PROBLEM — E86.0 DEHYDRATION: Status: ACTIVE | Noted: 2018-12-07

## 2018-12-07 LAB
ANION GAP SERPL CALCULATED.3IONS-SCNC: 10 MEQ/L (ref 7–13)
BASOPHILS ABSOLUTE: 0 K/UL (ref 0–0.2)
BASOPHILS RELATIVE PERCENT: 0.4 %
BUN BLDV-MCNC: 26 MG/DL (ref 6–20)
CALCIUM SERPL-MCNC: 9.1 MG/DL (ref 8.6–10.2)
CHLORIDE BLD-SCNC: 100 MEQ/L (ref 98–107)
CO2: 29 MEQ/L (ref 22–29)
CREAT SERPL-MCNC: 0.82 MG/DL (ref 0.7–1.2)
EOSINOPHILS ABSOLUTE: 0.1 K/UL (ref 0–0.7)
EOSINOPHILS RELATIVE PERCENT: 2.8 %
GFR AFRICAN AMERICAN: >60
GFR NON-AFRICAN AMERICAN: >60
GLUCOSE BLD-MCNC: 132 MG/DL (ref 60–115)
GLUCOSE BLD-MCNC: 138 MG/DL (ref 60–115)
GLUCOSE BLD-MCNC: 232 MG/DL (ref 74–109)
GLUCOSE BLD-MCNC: 323 MG/DL (ref 60–115)
GLUCOSE BLD-MCNC: 75 MG/DL (ref 60–115)
HCT VFR BLD CALC: 42.3 % (ref 42–52)
HEMOGLOBIN: 13.9 G/DL (ref 14–18)
INR BLD: 1.6
LYMPHOCYTES ABSOLUTE: 1.7 K/UL (ref 1–4.8)
LYMPHOCYTES RELATIVE PERCENT: 34.6 %
MAGNESIUM: 2.5 MG/DL (ref 1.7–2.3)
MCH RBC QN AUTO: 26.6 PG (ref 27–31.3)
MCHC RBC AUTO-ENTMCNC: 32.7 % (ref 33–37)
MCV RBC AUTO: 81.3 FL (ref 80–100)
MONOCYTES ABSOLUTE: 0.4 K/UL (ref 0.2–0.8)
MONOCYTES RELATIVE PERCENT: 7.5 %
NEUTROPHILS ABSOLUTE: 2.7 K/UL (ref 1.4–6.5)
NEUTROPHILS RELATIVE PERCENT: 54.7 %
PDW BLD-RTO: 17.3 % (ref 11.5–14.5)
PERFORMED ON: ABNORMAL
PERFORMED ON: NORMAL
PHOSPHORUS: 3.5 MG/DL (ref 2.5–4.5)
PLATELET # BLD: 44 K/UL (ref 130–400)
PLATELET SLIDE REVIEW: ABNORMAL
POTASSIUM SERPL-SCNC: 4.5 MEQ/L (ref 3.5–5.1)
PROTHROMBIN TIME: 16.2 SEC (ref 9–11.5)
RBC # BLD: 5.21 M/UL (ref 4.7–6.1)
SODIUM BLD-SCNC: 139 MEQ/L (ref 132–144)
WBC # BLD: 4.9 K/UL (ref 4.8–10.8)

## 2018-12-07 PROCEDURE — 76937 US GUIDE VASCULAR ACCESS: CPT | Performed by: RADIOLOGY

## 2018-12-07 PROCEDURE — 85025 COMPLETE CBC W/AUTO DIFF WBC: CPT

## 2018-12-07 PROCEDURE — 1210000000 HC MED SURG R&B

## 2018-12-07 PROCEDURE — 6360000002 HC RX W HCPCS: Performed by: INTERNAL MEDICINE

## 2018-12-07 PROCEDURE — 99253 IP/OBS CNSLTJ NEW/EST LOW 45: CPT | Performed by: INTERNAL MEDICINE

## 2018-12-07 PROCEDURE — 97110 THERAPEUTIC EXERCISES: CPT

## 2018-12-07 PROCEDURE — C9113 INJ PANTOPRAZOLE SODIUM, VIA: HCPCS | Performed by: INTERNAL MEDICINE

## 2018-12-07 PROCEDURE — 6370000000 HC RX 637 (ALT 250 FOR IP): Performed by: INTERNAL MEDICINE

## 2018-12-07 PROCEDURE — 85610 PROTHROMBIN TIME: CPT

## 2018-12-07 PROCEDURE — 80048 BASIC METABOLIC PNL TOTAL CA: CPT

## 2018-12-07 PROCEDURE — 84100 ASSAY OF PHOSPHORUS: CPT

## 2018-12-07 PROCEDURE — 2709999900 IR PICC WO SQ PORT/PUMP > 5 YEARS

## 2018-12-07 PROCEDURE — 36415 COLL VENOUS BLD VENIPUNCTURE: CPT

## 2018-12-07 PROCEDURE — 36569 INSJ PICC 5 YR+ W/O IMAGING: CPT | Performed by: RADIOLOGY

## 2018-12-07 PROCEDURE — 2580000003 HC RX 258: Performed by: INTERNAL MEDICINE

## 2018-12-07 PROCEDURE — 02HV33Z INSERTION OF INFUSION DEVICE INTO SUPERIOR VENA CAVA, PERCUTANEOUS APPROACH: ICD-10-PCS | Performed by: RADIOLOGY

## 2018-12-07 PROCEDURE — 77001 FLUOROGUIDE FOR VEIN DEVICE: CPT | Performed by: RADIOLOGY

## 2018-12-07 PROCEDURE — 2500000003 HC RX 250 WO HCPCS: Performed by: INTERNAL MEDICINE

## 2018-12-07 PROCEDURE — 83735 ASSAY OF MAGNESIUM: CPT

## 2018-12-07 PROCEDURE — 2580000003 HC RX 258: Performed by: PHYSICIAN ASSISTANT

## 2018-12-07 RX ORDER — MORPHINE SULFATE 2 MG/ML
2 INJECTION, SOLUTION INTRAMUSCULAR; INTRAVENOUS ONCE
Status: COMPLETED | OUTPATIENT
Start: 2018-12-08 | End: 2018-12-07

## 2018-12-07 RX ORDER — SODIUM CHLORIDE 9 MG/ML
250 INJECTION, SOLUTION INTRAVENOUS ONCE
Status: COMPLETED | OUTPATIENT
Start: 2018-12-07 | End: 2018-12-07

## 2018-12-07 RX ORDER — LIDOCAINE HYDROCHLORIDE 20 MG/ML
5 INJECTION, SOLUTION INFILTRATION; PERINEURAL ONCE
Status: COMPLETED | OUTPATIENT
Start: 2018-12-07 | End: 2018-12-07

## 2018-12-07 RX ORDER — PHYTONADIONE 10 MG/ML
10 INJECTION, EMULSION INTRAMUSCULAR; INTRAVENOUS; SUBCUTANEOUS ONCE
Status: COMPLETED | OUTPATIENT
Start: 2018-12-07 | End: 2018-12-07

## 2018-12-07 RX ORDER — SODIUM CHLORIDE 0.9 % (FLUSH) 0.9 %
10 SYRINGE (ML) INJECTION EVERY 12 HOURS SCHEDULED
Status: DISCONTINUED | OUTPATIENT
Start: 2018-12-07 | End: 2018-12-14 | Stop reason: HOSPADM

## 2018-12-07 RX ORDER — SODIUM CHLORIDE 0.9 % (FLUSH) 0.9 %
10 SYRINGE (ML) INJECTION PRN
Status: DISCONTINUED | OUTPATIENT
Start: 2018-12-07 | End: 2018-12-14 | Stop reason: HOSPADM

## 2018-12-07 RX ADMIN — GABAPENTIN 800 MG: 400 CAPSULE ORAL at 13:11

## 2018-12-07 RX ADMIN — GABAPENTIN 800 MG: 400 CAPSULE ORAL at 17:23

## 2018-12-07 RX ADMIN — Medication 10 ML: at 13:18

## 2018-12-07 RX ADMIN — MELOXICAM 15 MG: 7.5 TABLET ORAL at 08:54

## 2018-12-07 RX ADMIN — POTASSIUM CHLORIDE 40 MEQ: 20 TABLET, EXTENDED RELEASE ORAL at 04:48

## 2018-12-07 RX ADMIN — LEVOTHYROXINE SODIUM 50 MCG: 50 TABLET ORAL at 04:54

## 2018-12-07 RX ADMIN — FOLIC ACID 1 MG: 1 TABLET ORAL at 13:11

## 2018-12-07 RX ADMIN — SODIUM CHLORIDE 250 ML: 9 INJECTION, SOLUTION INTRAVENOUS at 10:55

## 2018-12-07 RX ADMIN — GABAPENTIN 800 MG: 400 CAPSULE ORAL at 08:54

## 2018-12-07 RX ADMIN — POLYETHYLENE GLYCOL 3350, SODIUM SULFATE ANHYDROUS, SODIUM BICARBONATE, SODIUM CHLORIDE, POTASSIUM CHLORIDE 4000 ML: 236; 22.74; 6.74; 5.86; 2.97 POWDER, FOR SOLUTION ORAL at 04:50

## 2018-12-07 RX ADMIN — MUPIROCIN: 20 OINTMENT TOPICAL at 13:12

## 2018-12-07 RX ADMIN — MORPHINE SULFATE 2 MG: 2 INJECTION, SOLUTION INTRAMUSCULAR; INTRAVENOUS at 23:59

## 2018-12-07 RX ADMIN — DOCUSATE SODIUM 100 MG: 100 CAPSULE, LIQUID FILLED ORAL at 08:54

## 2018-12-07 RX ADMIN — DEXTROAMPHETAMINE SACCHARATE, AMPHETAMINE ASPARTATE, DEXTROAMPHETAMINE SULFATE AND AMPHETAMINE SULFATE 20 MG: 2.5; 2.5; 2.5; 2.5 TABLET ORAL at 08:54

## 2018-12-07 RX ADMIN — INSULIN LISPRO 8 UNITS: 100 INJECTION, SOLUTION INTRAVENOUS; SUBCUTANEOUS at 08:56

## 2018-12-07 RX ADMIN — DEXTROAMPHETAMINE SACCHARATE, AMPHETAMINE ASPARTATE, DEXTROAMPHETAMINE SULFATE AND AMPHETAMINE SULFATE 20 MG: 2.5; 2.5; 2.5; 2.5 TABLET ORAL at 13:11

## 2018-12-07 RX ADMIN — WARFARIN SODIUM 10 MG: 5 TABLET ORAL at 17:23

## 2018-12-07 RX ADMIN — SODIUM CHLORIDE: 9 INJECTION, SOLUTION INTRAVENOUS at 09:25

## 2018-12-07 RX ADMIN — LIDOCAINE HYDROCHLORIDE 5 ML: 20 INJECTION, SOLUTION INFILTRATION; PERINEURAL at 10:55

## 2018-12-07 RX ADMIN — PHYTONADIONE 10 MG: 10 INJECTION, EMULSION INTRAMUSCULAR; INTRAVENOUS; SUBCUTANEOUS at 02:23

## 2018-12-07 RX ADMIN — PANTOPRAZOLE SODIUM 40 MG: 40 INJECTION, POWDER, FOR SOLUTION INTRAVENOUS at 08:54

## 2018-12-07 RX ADMIN — Medication 10 ML: at 08:54

## 2018-12-07 RX ADMIN — HYDROCORTISONE SODIUM SUCCINATE 50 MG: 100 INJECTION, POWDER, FOR SOLUTION INTRAMUSCULAR; INTRAVENOUS at 18:05

## 2018-12-07 ASSESSMENT — PAIN DESCRIPTION - ORIENTATION: ORIENTATION: RIGHT

## 2018-12-07 ASSESSMENT — PAIN SCALES - GENERAL
PAINLEVEL_OUTOF10: 5
PAINLEVEL_OUTOF10: 0
PAINLEVEL_OUTOF10: 10
PAINLEVEL_OUTOF10: 3

## 2018-12-07 ASSESSMENT — PAIN DESCRIPTION - LOCATION: LOCATION: ARM;NECK

## 2018-12-07 ASSESSMENT — PAIN DESCRIPTION - PAIN TYPE: TYPE: CHRONIC PAIN

## 2018-12-07 NOTE — CARE COORDINATION
Plan DC to [facility]. Pre-cert has been started and [id number] complete. Per [facility], they cannot provide chemo medication for pt and can only accept if he can bring this medication himself. LESLEE met with pt to explain. He said he is aware of this as he dealt with this when he was recently at [facility]. He also said he just met with Dr. Anita Hoyos who said he had ordered the medication from a \"Specialty Pharmacy\" which will then be delivered to [facility] and he said Dr. Anita Hoyos will be checking on the order status. Pt said his wife can pick the medication up when received and bring to him.

## 2018-12-07 NOTE — CONSULTS
Hematology/Oncology Consult  Encounter Date: 2018 9:22 AM    Mr. Jenifer Fry is a 48 y.o. male  : 1965  MRN: [id number]  [facility]  Requesting Provider: Brisa Garner MD    Reason for request: Further management of Metastatic Renal cell carcinoma      CONSULTANT: Josselin Ervin    HPI: Mr. Carol White is a 48years old  male with a history of metastatic Renal cell carcinoma has been treated in the past with [facility] without much benefit. He has mets to cervical and thoracic spine and had Neurosurgical intervention followed by Radiation therapy . There has been relentless progression of his metastatic disease and He is not a candidate to receive more Radiation therapy. Arrangements were being made to start him on Cabometyx when DAI Nina. He went home from the Nursing home after rehab. He noticed marked weakness of both legs and fell sustaining an abrasion over Right leg. He went inside the house but could not get out of the bed because of leg weakness. Hence He was brought to [facility] for admission. Patient states that He has not had any bowel movement for 1 week. His pain relief is adequate with current narcotic analgesics. Consultation is requested regarding further management of his malignancy.     Patient Active Problem List   Diagnosis    Carpal tunnel syndrome of right wrist    Tardy ulnar nerve palsy    Rhomboid muscle strain    Non morbid obesity due to excess calories    Strain of thoracic region    Unable to ambulate    Renal cell carcinoma of right kidney (HCC)    Status post nephrectomy    S/P radiotherapy    Cervical radiculopathy due to neoplasm    Cervical disc disorder at C6-C7 level with radiculopathy    Morbid obesity ([address]    COPD (chronic obstructive pulmonary disease) (HCC)    MARIBEL on CPAP    Paresthesia of right upper extremity    Weakness of both lower extremities    History of pulmonary

## 2018-12-08 LAB
ANION GAP SERPL CALCULATED.3IONS-SCNC: 11 MEQ/L (ref 7–13)
ANISOCYTOSIS: ABNORMAL
ATYPICAL LYMPHOCYTE RELATIVE PERCENT: 9 %
BANDED NEUTROPHILS RELATIVE PERCENT: 3 %
BASOPHILS ABSOLUTE: 0.1 K/UL (ref 0–0.2)
BASOPHILS RELATIVE PERCENT: 2 %
BUN BLDV-MCNC: 22 MG/DL (ref 6–20)
CALCIUM SERPL-MCNC: 9 MG/DL (ref 8.6–10.2)
CHLORIDE BLD-SCNC: 102 MEQ/L (ref 98–107)
CO2: 29 MEQ/L (ref 22–29)
CREAT SERPL-MCNC: 0.61 MG/DL (ref 0.7–1.2)
EOSINOPHILS ABSOLUTE: 0 K/UL (ref 0–0.7)
EOSINOPHILS RELATIVE PERCENT: 1 %
GFR AFRICAN AMERICAN: >60
GFR NON-AFRICAN AMERICAN: >60
GLUCOSE BLD-MCNC: 125 MG/DL (ref 60–115)
GLUCOSE BLD-MCNC: 144 MG/DL (ref 74–109)
GLUCOSE BLD-MCNC: 156 MG/DL (ref 60–115)
GLUCOSE BLD-MCNC: 157 MG/DL (ref 60–115)
GLUCOSE BLD-MCNC: 159 MG/DL (ref 60–115)
HCT VFR BLD CALC: 39.5 % (ref 42–52)
HEMOGLOBIN: 13 G/DL (ref 14–18)
INR BLD: 1.2
LYMPHOCYTES ABSOLUTE: 0.8 K/UL (ref 1–4.8)
LYMPHOCYTES RELATIVE PERCENT: 19 %
MACROCYTES: 0
MAGNESIUM: 2 MG/DL (ref 1.7–2.3)
MCH RBC QN AUTO: 26.8 PG (ref 27–31.3)
MCHC RBC AUTO-ENTMCNC: 33 % (ref 33–37)
MCV RBC AUTO: 81.2 FL (ref 80–100)
MICROCYTES: ABNORMAL
MONOCYTES ABSOLUTE: 0.1 K/UL (ref 0.2–0.8)
MONOCYTES RELATIVE PERCENT: 3.8 %
NEUTROPHILS ABSOLUTE: 1.9 K/UL (ref 1.4–6.5)
NEUTROPHILS RELATIVE PERCENT: 62 %
OVALOCYTES: ABNORMAL
PDW BLD-RTO: 17.3 % (ref 11.5–14.5)
PERFORMED ON: ABNORMAL
PHOSPHORUS: 3.4 MG/DL (ref 2.5–4.5)
PLATELET # BLD: 70 K/UL (ref 130–400)
PLATELET SLIDE REVIEW: ABNORMAL
POIKILOCYTES: ABNORMAL
POTASSIUM SERPL-SCNC: 4.8 MEQ/L (ref 3.5–5.1)
PROTHROMBIN TIME: 12 SEC (ref 9–11.5)
RBC # BLD: 4.87 M/UL (ref 4.7–6.1)
SLIDE REVIEW: ABNORMAL
SODIUM BLD-SCNC: 142 MEQ/L (ref 132–144)
TEAR DROP CELLS: ABNORMAL
WBC # BLD: 2.9 K/UL (ref 4.8–10.8)

## 2018-12-08 PROCEDURE — 84100 ASSAY OF PHOSPHORUS: CPT

## 2018-12-08 PROCEDURE — 85025 COMPLETE CBC W/AUTO DIFF WBC: CPT

## 2018-12-08 PROCEDURE — 6360000002 HC RX W HCPCS: Performed by: INTERNAL MEDICINE

## 2018-12-08 PROCEDURE — 6370000000 HC RX 637 (ALT 250 FOR IP): Performed by: INTERNAL MEDICINE

## 2018-12-08 PROCEDURE — 85610 PROTHROMBIN TIME: CPT

## 2018-12-08 PROCEDURE — 36415 COLL VENOUS BLD VENIPUNCTURE: CPT

## 2018-12-08 PROCEDURE — 2580000003 HC RX 258: Performed by: INTERNAL MEDICINE

## 2018-12-08 PROCEDURE — 80048 BASIC METABOLIC PNL TOTAL CA: CPT

## 2018-12-08 PROCEDURE — 1210000000 HC MED SURG R&B

## 2018-12-08 PROCEDURE — C9113 INJ PANTOPRAZOLE SODIUM, VIA: HCPCS | Performed by: INTERNAL MEDICINE

## 2018-12-08 PROCEDURE — 83735 ASSAY OF MAGNESIUM: CPT

## 2018-12-08 PROCEDURE — 2580000003 HC RX 258: Performed by: PHYSICIAN ASSISTANT

## 2018-12-08 RX ORDER — MORPHINE SULFATE 4 MG/ML
4 INJECTION, SOLUTION INTRAMUSCULAR; INTRAVENOUS EVERY 4 HOURS PRN
Status: DISCONTINUED | OUTPATIENT
Start: 2018-12-08 | End: 2018-12-12

## 2018-12-08 RX ORDER — POLYETHYLENE GLYCOL 3350 17 G/17G
34 POWDER, FOR SOLUTION ORAL 2 TIMES DAILY
Status: DISCONTINUED | OUTPATIENT
Start: 2018-12-08 | End: 2018-12-14 | Stop reason: HOSPADM

## 2018-12-08 RX ORDER — DEXAMETHASONE 2 MG/1
2 TABLET ORAL NIGHTLY
Status: DISCONTINUED | OUTPATIENT
Start: 2018-12-08 | End: 2018-12-14 | Stop reason: HOSPADM

## 2018-12-08 RX ADMIN — GABAPENTIN 800 MG: 400 CAPSULE ORAL at 00:07

## 2018-12-08 RX ADMIN — MELOXICAM 15 MG: 7.5 TABLET ORAL at 09:10

## 2018-12-08 RX ADMIN — Medication 10 ML: at 09:09

## 2018-12-08 RX ADMIN — INSULIN GLARGINE 10 UNITS: 100 INJECTION, SOLUTION SUBCUTANEOUS at 00:13

## 2018-12-08 RX ADMIN — ONDANSETRON 4 MG: 2 INJECTION INTRAMUSCULAR; INTRAVENOUS at 00:02

## 2018-12-08 RX ADMIN — DOCUSATE SODIUM 100 MG: 100 CAPSULE, LIQUID FILLED ORAL at 20:29

## 2018-12-08 RX ADMIN — MORPHINE SULFATE 4 MG: 4 INJECTION, SOLUTION INTRAMUSCULAR; INTRAVENOUS at 01:58

## 2018-12-08 RX ADMIN — Medication 10 ML: at 00:08

## 2018-12-08 RX ADMIN — INSULIN LISPRO 2 UNITS: 100 INJECTION, SOLUTION INTRAVENOUS; SUBCUTANEOUS at 16:55

## 2018-12-08 RX ADMIN — DEXTROAMPHETAMINE SACCHARATE, AMPHETAMINE ASPARTATE, DEXTROAMPHETAMINE SULFATE AND AMPHETAMINE SULFATE 20 MG: 2.5; 2.5; 2.5; 2.5 TABLET ORAL at 09:10

## 2018-12-08 RX ADMIN — INSULIN GLARGINE 10 UNITS: 100 INJECTION, SOLUTION SUBCUTANEOUS at 22:12

## 2018-12-08 RX ADMIN — ONDANSETRON 4 MG: 2 INJECTION INTRAMUSCULAR; INTRAVENOUS at 16:45

## 2018-12-08 RX ADMIN — INSULIN LISPRO 2 UNITS: 100 INJECTION, SOLUTION INTRAVENOUS; SUBCUTANEOUS at 12:16

## 2018-12-08 RX ADMIN — DOCUSATE SODIUM 100 MG: 100 CAPSULE, LIQUID FILLED ORAL at 09:10

## 2018-12-08 RX ADMIN — MORPHINE SULFATE 4 MG: 4 INJECTION, SOLUTION INTRAMUSCULAR; INTRAVENOUS at 16:45

## 2018-12-08 RX ADMIN — Medication 10 ML: at 23:37

## 2018-12-08 RX ADMIN — GABAPENTIN 800 MG: 400 CAPSULE ORAL at 14:24

## 2018-12-08 RX ADMIN — SODIUM CHLORIDE: 9 INJECTION, SOLUTION INTRAVENOUS at 23:37

## 2018-12-08 RX ADMIN — WARFARIN SODIUM 10 MG: 5 TABLET ORAL at 19:24

## 2018-12-08 RX ADMIN — DEXAMETHASONE 2 MG: 2 TABLET ORAL at 01:58

## 2018-12-08 RX ADMIN — POLYETHYLENE GLYCOL 3350 17 G: 17 POWDER, FOR SOLUTION ORAL at 09:11

## 2018-12-08 RX ADMIN — GABAPENTIN 800 MG: 400 CAPSULE ORAL at 09:11

## 2018-12-08 RX ADMIN — GABAPENTIN 800 MG: 400 CAPSULE ORAL at 23:37

## 2018-12-08 RX ADMIN — DEXAMETHASONE 2 MG: 2 TABLET ORAL at 20:29

## 2018-12-08 RX ADMIN — MORPHINE SULFATE 4 MG: 4 INJECTION, SOLUTION INTRAMUSCULAR; INTRAVENOUS at 05:55

## 2018-12-08 RX ADMIN — PANTOPRAZOLE SODIUM 40 MG: 40 INJECTION, POWDER, FOR SOLUTION INTRAVENOUS at 09:10

## 2018-12-08 RX ADMIN — FOLIC ACID 1 MG: 1 TABLET ORAL at 09:11

## 2018-12-08 RX ADMIN — Medication 10 ML: at 20:35

## 2018-12-08 RX ADMIN — Medication 10 ML: at 20:37

## 2018-12-08 RX ADMIN — BISACODYL 20 MG: 5 TABLET, COATED ORAL at 16:44

## 2018-12-08 RX ADMIN — Medication 10 ML: at 09:08

## 2018-12-08 RX ADMIN — SODIUM CHLORIDE: 9 INJECTION, SOLUTION INTRAVENOUS at 10:47

## 2018-12-08 RX ADMIN — GABAPENTIN 800 MG: 400 CAPSULE ORAL at 19:24

## 2018-12-08 RX ADMIN — MUPIROCIN: 20 OINTMENT TOPICAL at 09:26

## 2018-12-08 RX ADMIN — DOCUSATE SODIUM 100 MG: 100 CAPSULE, LIQUID FILLED ORAL at 00:07

## 2018-12-08 RX ADMIN — SODIUM CHLORIDE: 9 INJECTION, SOLUTION INTRAVENOUS at 00:07

## 2018-12-08 RX ADMIN — HYDROCORTISONE SODIUM SUCCINATE 50 MG: 100 INJECTION, POWDER, FOR SOLUTION INTRAMUSCULAR; INTRAVENOUS at 09:09

## 2018-12-08 RX ADMIN — POLYETHYLENE GLYCOL 3350 34 G: 17 POWDER, FOR SOLUTION ORAL at 20:33

## 2018-12-08 RX ADMIN — LEVOTHYROXINE SODIUM 50 MCG: 50 TABLET ORAL at 05:55

## 2018-12-08 ASSESSMENT — PAIN SCALES - GENERAL
PAINLEVEL_OUTOF10: 8
PAINLEVEL_OUTOF10: 0
PAINLEVEL_OUTOF10: 7
PAINLEVEL_OUTOF10: 7
PAINLEVEL_OUTOF10: 5
PAINLEVEL_OUTOF10: 5

## 2018-12-09 LAB
ANION GAP SERPL CALCULATED.3IONS-SCNC: 4 MEQ/L (ref 7–13)
BASOPHILS ABSOLUTE: 0 K/UL (ref 0–0.2)
BASOPHILS RELATIVE PERCENT: 0.3 %
BUN BLDV-MCNC: 20 MG/DL (ref 6–20)
CALCIUM SERPL-MCNC: 8.8 MG/DL (ref 8.6–10.2)
CHLORIDE BLD-SCNC: 101 MEQ/L (ref 98–107)
CO2: 33 MEQ/L (ref 22–29)
CREAT SERPL-MCNC: 0.78 MG/DL (ref 0.7–1.2)
EOSINOPHILS ABSOLUTE: 0 K/UL (ref 0–0.7)
EOSINOPHILS RELATIVE PERCENT: 1.7 %
GFR AFRICAN AMERICAN: >60
GFR NON-AFRICAN AMERICAN: >60
GLUCOSE BLD-MCNC: 129 MG/DL (ref 60–115)
GLUCOSE BLD-MCNC: 152 MG/DL (ref 60–115)
GLUCOSE BLD-MCNC: 176 MG/DL (ref 60–115)
GLUCOSE BLD-MCNC: 227 MG/DL (ref 74–109)
GLUCOSE BLD-MCNC: 80 MG/DL (ref 60–115)
HCT VFR BLD CALC: 38.7 % (ref 42–52)
HEMOGLOBIN: 12.5 G/DL (ref 14–18)
LYMPHOCYTES ABSOLUTE: 1 K/UL (ref 1–4.8)
LYMPHOCYTES RELATIVE PERCENT: 35.6 %
MAGNESIUM: 1.9 MG/DL (ref 1.7–2.3)
MCH RBC QN AUTO: 26.4 PG (ref 27–31.3)
MCHC RBC AUTO-ENTMCNC: 32.3 % (ref 33–37)
MCV RBC AUTO: 81.8 FL (ref 80–100)
MONOCYTES ABSOLUTE: 0.2 K/UL (ref 0.2–0.8)
MONOCYTES RELATIVE PERCENT: 6.1 %
NEUTROPHILS ABSOLUTE: 1.6 K/UL (ref 1.4–6.5)
NEUTROPHILS RELATIVE PERCENT: 56.3 %
PDW BLD-RTO: 17 % (ref 11.5–14.5)
PERFORMED ON: ABNORMAL
PERFORMED ON: NORMAL
PHOSPHORUS: 3 MG/DL (ref 2.5–4.5)
PLATELET # BLD: 66 K/UL (ref 130–400)
POTASSIUM SERPL-SCNC: 4.9 MEQ/L (ref 3.5–5.1)
RBC # BLD: 4.73 M/UL (ref 4.7–6.1)
SODIUM BLD-SCNC: 138 MEQ/L (ref 132–144)
WBC # BLD: 2.9 K/UL (ref 4.8–10.8)

## 2018-12-09 PROCEDURE — 2700000000 HC OXYGEN THERAPY PER DAY

## 2018-12-09 PROCEDURE — 6360000002 HC RX W HCPCS: Performed by: INTERNAL MEDICINE

## 2018-12-09 PROCEDURE — 85025 COMPLETE CBC W/AUTO DIFF WBC: CPT

## 2018-12-09 PROCEDURE — 83735 ASSAY OF MAGNESIUM: CPT

## 2018-12-09 PROCEDURE — 36415 COLL VENOUS BLD VENIPUNCTURE: CPT

## 2018-12-09 PROCEDURE — 2580000003 HC RX 258: Performed by: PHYSICIAN ASSISTANT

## 2018-12-09 PROCEDURE — C9113 INJ PANTOPRAZOLE SODIUM, VIA: HCPCS | Performed by: INTERNAL MEDICINE

## 2018-12-09 PROCEDURE — 80048 BASIC METABOLIC PNL TOTAL CA: CPT

## 2018-12-09 PROCEDURE — 2580000003 HC RX 258: Performed by: INTERNAL MEDICINE

## 2018-12-09 PROCEDURE — 6370000000 HC RX 637 (ALT 250 FOR IP): Performed by: INTERNAL MEDICINE

## 2018-12-09 PROCEDURE — 36592 COLLECT BLOOD FROM PICC: CPT

## 2018-12-09 PROCEDURE — 1210000000 HC MED SURG R&B

## 2018-12-09 PROCEDURE — 84100 ASSAY OF PHOSPHORUS: CPT

## 2018-12-09 RX ORDER — MAGNESIUM SULFATE IN WATER 40 MG/ML
2 INJECTION, SOLUTION INTRAVENOUS ONCE
Status: COMPLETED | OUTPATIENT
Start: 2018-12-10 | End: 2018-12-10

## 2018-12-09 RX ADMIN — POLYETHYLENE GLYCOL 3350 34 G: 17 POWDER, FOR SOLUTION ORAL at 21:30

## 2018-12-09 RX ADMIN — DEXTROAMPHETAMINE SACCHARATE, AMPHETAMINE ASPARTATE, DEXTROAMPHETAMINE SULFATE AND AMPHETAMINE SULFATE 20 MG: 2.5; 2.5; 2.5; 2.5 TABLET ORAL at 09:00

## 2018-12-09 RX ADMIN — PANTOPRAZOLE SODIUM 40 MG: 40 INJECTION, POWDER, FOR SOLUTION INTRAVENOUS at 08:59

## 2018-12-09 RX ADMIN — MORPHINE SULFATE 4 MG: 4 INJECTION, SOLUTION INTRAMUSCULAR; INTRAVENOUS at 20:41

## 2018-12-09 RX ADMIN — DEXAMETHASONE 2 MG: 2 TABLET ORAL at 21:25

## 2018-12-09 RX ADMIN — Medication 10 ML: at 21:35

## 2018-12-09 RX ADMIN — MUPIROCIN: 20 OINTMENT TOPICAL at 09:57

## 2018-12-09 RX ADMIN — SODIUM CHLORIDE: 9 INJECTION, SOLUTION INTRAVENOUS at 09:52

## 2018-12-09 RX ADMIN — GABAPENTIN 800 MG: 400 CAPSULE ORAL at 16:41

## 2018-12-09 RX ADMIN — POLYETHYLENE GLYCOL 3350 34 G: 17 POWDER, FOR SOLUTION ORAL at 08:59

## 2018-12-09 RX ADMIN — DEXTROAMPHETAMINE SACCHARATE, AMPHETAMINE ASPARTATE, DEXTROAMPHETAMINE SULFATE AND AMPHETAMINE SULFATE 20 MG: 2.5; 2.5; 2.5; 2.5 TABLET ORAL at 16:41

## 2018-12-09 RX ADMIN — MORPHINE SULFATE 4 MG: 4 INJECTION, SOLUTION INTRAMUSCULAR; INTRAVENOUS at 09:52

## 2018-12-09 RX ADMIN — MELOXICAM 15 MG: 7.5 TABLET ORAL at 09:00

## 2018-12-09 RX ADMIN — FOLIC ACID 1 MG: 1 TABLET ORAL at 09:00

## 2018-12-09 RX ADMIN — DEXTROAMPHETAMINE SACCHARATE, AMPHETAMINE ASPARTATE, DEXTROAMPHETAMINE SULFATE AND AMPHETAMINE SULFATE 20 MG: 2.5; 2.5; 2.5; 2.5 TABLET ORAL at 12:14

## 2018-12-09 RX ADMIN — DOCUSATE SODIUM 100 MG: 100 CAPSULE, LIQUID FILLED ORAL at 09:00

## 2018-12-09 RX ADMIN — WARFARIN SODIUM 10 MG: 5 TABLET ORAL at 16:41

## 2018-12-09 RX ADMIN — GABAPENTIN 800 MG: 400 CAPSULE ORAL at 12:14

## 2018-12-09 RX ADMIN — ONDANSETRON 4 MG: 2 INJECTION INTRAMUSCULAR; INTRAVENOUS at 09:56

## 2018-12-09 RX ADMIN — GABAPENTIN 800 MG: 400 CAPSULE ORAL at 21:25

## 2018-12-09 RX ADMIN — DOCUSATE SODIUM 100 MG: 100 CAPSULE, LIQUID FILLED ORAL at 21:25

## 2018-12-09 RX ADMIN — INSULIN LISPRO 2 UNITS: 100 INJECTION, SOLUTION INTRAVENOUS; SUBCUTANEOUS at 09:06

## 2018-12-09 RX ADMIN — Medication 10 ML: at 21:36

## 2018-12-09 RX ADMIN — SODIUM CHLORIDE: 9 INJECTION, SOLUTION INTRAVENOUS at 20:26

## 2018-12-09 RX ADMIN — INSULIN LISPRO 2 UNITS: 100 INJECTION, SOLUTION INTRAVENOUS; SUBCUTANEOUS at 16:41

## 2018-12-09 RX ADMIN — GABAPENTIN 800 MG: 400 CAPSULE ORAL at 09:00

## 2018-12-09 RX ADMIN — LEVOTHYROXINE SODIUM 50 MCG: 50 TABLET ORAL at 06:56

## 2018-12-09 RX ADMIN — ONDANSETRON 4 MG: 2 INJECTION INTRAMUSCULAR; INTRAVENOUS at 20:40

## 2018-12-09 ASSESSMENT — PAIN SCALES - GENERAL
PAINLEVEL_OUTOF10: 3
PAINLEVEL_OUTOF10: 8
PAINLEVEL_OUTOF10: 8
PAINLEVEL_OUTOF10: 5

## 2018-12-09 NOTE — PLAN OF CARE
Problem: Pain:  Goal: Pain level will decrease  Pain level will decrease   Outcome: Ongoing    Goal: Control of chronic pain  Control of chronic pain   Outcome: Ongoing      Problem: Mobility - Impaired:  Goal: Mobility will improve  Mobility will improve   Outcome: Ongoing      Problem: Risk for Impaired Skin Integrity  Goal: Tissue integrity - skin and mucous membranes  Structural intactness and normal physiological function of skin and  mucous membranes.    Outcome: Ongoing

## 2018-12-10 ENCOUNTER — APPOINTMENT (OUTPATIENT)
Dept: CT IMAGING | Age: 53
DRG: 542 | End: 2018-12-10
Payer: COMMERCIAL

## 2018-12-10 LAB
ANION GAP SERPL CALCULATED.3IONS-SCNC: 6 MEQ/L (ref 7–13)
BASE EXCESS ARTERIAL: 9 (ref -3–3)
BASOPHILS ABSOLUTE: 0 K/UL (ref 0–0.2)
BASOPHILS RELATIVE PERCENT: 0.4 %
BUN BLDV-MCNC: 18 MG/DL (ref 6–20)
CALCIUM SERPL-MCNC: 9 MG/DL (ref 8.6–10.2)
CHLORIDE BLD-SCNC: 102 MEQ/L (ref 98–107)
CO2: 31 MEQ/L (ref 22–29)
CREAT SERPL-MCNC: 0.67 MG/DL (ref 0.7–1.2)
EOSINOPHILS ABSOLUTE: 0 K/UL (ref 0–0.7)
EOSINOPHILS RELATIVE PERCENT: 0.6 %
GFR AFRICAN AMERICAN: >60
GFR NON-AFRICAN AMERICAN: >60
GLUCOSE BLD-MCNC: 147 MG/DL (ref 60–115)
GLUCOSE BLD-MCNC: 151 MG/DL (ref 60–115)
GLUCOSE BLD-MCNC: 152 MG/DL (ref 60–115)
GLUCOSE BLD-MCNC: 188 MG/DL (ref 60–115)
GLUCOSE BLD-MCNC: 191 MG/DL (ref 60–115)
GLUCOSE BLD-MCNC: 222 MG/DL (ref 74–109)
GLUCOSE BLD-MCNC: 340 MG/DL (ref 60–115)
HCO3 ARTERIAL: 37.4 MMOL/L (ref 21–29)
HCT VFR BLD CALC: 40.2 % (ref 42–52)
HEMOGLOBIN: 12.9 G/DL (ref 14–18)
LACTATE: 0.62 MMOL/L (ref 0.4–2)
LYMPHOCYTES ABSOLUTE: 0.6 K/UL (ref 1–4.8)
LYMPHOCYTES RELATIVE PERCENT: 18.3 %
MAGNESIUM: 1.8 MG/DL (ref 1.7–2.3)
MCH RBC QN AUTO: 26.6 PG (ref 27–31.3)
MCHC RBC AUTO-ENTMCNC: 32.2 % (ref 33–37)
MCV RBC AUTO: 82.5 FL (ref 80–100)
MONOCYTES ABSOLUTE: 0.3 K/UL (ref 0.2–0.8)
MONOCYTES RELATIVE PERCENT: 8.3 %
NEUTROPHILS ABSOLUTE: 2.5 K/UL (ref 1.4–6.5)
NEUTROPHILS RELATIVE PERCENT: 72.4 %
O2 SAT, ARTERIAL: 98 % (ref 93–100)
PCO2 ARTERIAL: 94 MM HG (ref 35–45)
PDW BLD-RTO: 17.4 % (ref 11.5–14.5)
PERFORMED ON: ABNORMAL
PH ARTERIAL: 7.21 (ref 7.35–7.45)
PHOSPHORUS: 3 MG/DL (ref 2.5–4.5)
PLATELET # BLD: 66 K/UL (ref 130–400)
PO2 ARTERIAL: 129 MM HG (ref 75–108)
POC SAMPLE TYPE: ABNORMAL
POTASSIUM SERPL-SCNC: 5.4 MEQ/L (ref 3.5–5.1)
RBC # BLD: 4.87 M/UL (ref 4.7–6.1)
SODIUM BLD-SCNC: 139 MEQ/L (ref 132–144)
TCO2 ARTERIAL: 40 (ref 22–29)
WBC # BLD: 3.4 K/UL (ref 4.8–10.8)

## 2018-12-10 PROCEDURE — 6370000000 HC RX 637 (ALT 250 FOR IP): Performed by: INTERNAL MEDICINE

## 2018-12-10 PROCEDURE — 80048 BASIC METABOLIC PNL TOTAL CA: CPT

## 2018-12-10 PROCEDURE — 6360000002 HC RX W HCPCS: Performed by: INTERNAL MEDICINE

## 2018-12-10 PROCEDURE — 83605 ASSAY OF LACTIC ACID: CPT

## 2018-12-10 PROCEDURE — C9113 INJ PANTOPRAZOLE SODIUM, VIA: HCPCS | Performed by: INTERNAL MEDICINE

## 2018-12-10 PROCEDURE — 70470 CT HEAD/BRAIN W/O & W/DYE: CPT

## 2018-12-10 PROCEDURE — 82803 BLOOD GASES ANY COMBINATION: CPT

## 2018-12-10 PROCEDURE — 1210000000 HC MED SURG R&B

## 2018-12-10 PROCEDURE — 36592 COLLECT BLOOD FROM PICC: CPT

## 2018-12-10 PROCEDURE — 84100 ASSAY OF PHOSPHORUS: CPT

## 2018-12-10 PROCEDURE — 94640 AIRWAY INHALATION TREATMENT: CPT

## 2018-12-10 PROCEDURE — 83735 ASSAY OF MAGNESIUM: CPT

## 2018-12-10 PROCEDURE — 85025 COMPLETE CBC W/AUTO DIFF WBC: CPT

## 2018-12-10 PROCEDURE — 6360000004 HC RX CONTRAST MEDICATION: Performed by: INTERNAL MEDICINE

## 2018-12-10 PROCEDURE — 2580000003 HC RX 258: Performed by: INTERNAL MEDICINE

## 2018-12-10 PROCEDURE — 82948 REAGENT STRIP/BLOOD GLUCOSE: CPT

## 2018-12-10 PROCEDURE — 36600 WITHDRAWAL OF ARTERIAL BLOOD: CPT

## 2018-12-10 RX ORDER — LORAZEPAM 2 MG/ML
0.5 INJECTION INTRAMUSCULAR EVERY 6 HOURS PRN
Status: DISCONTINUED | OUTPATIENT
Start: 2018-12-10 | End: 2018-12-14 | Stop reason: HOSPADM

## 2018-12-10 RX ADMIN — Medication 10 ML: at 20:00

## 2018-12-10 RX ADMIN — DOCUSATE SODIUM 100 MG: 100 CAPSULE, LIQUID FILLED ORAL at 19:59

## 2018-12-10 RX ADMIN — Medication 10 ML: at 08:51

## 2018-12-10 RX ADMIN — FOLIC ACID 1 MG: 1 TABLET ORAL at 08:50

## 2018-12-10 RX ADMIN — IOPAMIDOL 50 ML: 612 INJECTION, SOLUTION INTRAVENOUS at 19:20

## 2018-12-10 RX ADMIN — BISACODYL 20 MG: 5 TABLET, COATED ORAL at 01:33

## 2018-12-10 RX ADMIN — POLYETHYLENE GLYCOL 3350 34 G: 17 POWDER, FOR SOLUTION ORAL at 08:50

## 2018-12-10 RX ADMIN — MORPHINE SULFATE 4 MG: 4 INJECTION, SOLUTION INTRAMUSCULAR; INTRAVENOUS at 16:00

## 2018-12-10 RX ADMIN — MAGNESIUM SULFATE IN WATER 2 G: 40 INJECTION, SOLUTION INTRAVENOUS at 08:54

## 2018-12-10 RX ADMIN — INSULIN LISPRO 8 UNITS: 100 INJECTION, SOLUTION INTRAVENOUS; SUBCUTANEOUS at 12:12

## 2018-12-10 RX ADMIN — IPRATROPIUM BROMIDE AND ALBUTEROL SULFATE 3 ML: 2.5; .5 SOLUTION RESPIRATORY (INHALATION) at 21:54

## 2018-12-10 RX ADMIN — Medication 10 ML: at 08:56

## 2018-12-10 RX ADMIN — ACETAMINOPHEN 650 MG: 325 TABLET ORAL at 15:18

## 2018-12-10 RX ADMIN — ONDANSETRON 4 MG: 2 INJECTION INTRAMUSCULAR; INTRAVENOUS at 15:59

## 2018-12-10 RX ADMIN — POLYETHYLENE GLYCOL 3350 34 G: 17 POWDER, FOR SOLUTION ORAL at 19:59

## 2018-12-10 RX ADMIN — MUPIROCIN: 20 OINTMENT TOPICAL at 11:34

## 2018-12-10 RX ADMIN — DEXAMETHASONE 2 MG: 2 TABLET ORAL at 19:59

## 2018-12-10 RX ADMIN — DOCUSATE SODIUM 100 MG: 100 CAPSULE, LIQUID FILLED ORAL at 08:50

## 2018-12-10 RX ADMIN — SODIUM CHLORIDE: 9 INJECTION, SOLUTION INTRAVENOUS at 12:27

## 2018-12-10 RX ADMIN — PANTOPRAZOLE SODIUM 40 MG: 40 INJECTION, POWDER, FOR SOLUTION INTRAVENOUS at 08:50

## 2018-12-10 RX ADMIN — INSULIN GLARGINE 10 UNITS: 100 INJECTION, SOLUTION SUBCUTANEOUS at 21:34

## 2018-12-10 RX ADMIN — IPRATROPIUM BROMIDE AND ALBUTEROL SULFATE 3 ML: 2.5; .5 SOLUTION RESPIRATORY (INHALATION) at 15:39

## 2018-12-10 RX ADMIN — LEVOTHYROXINE SODIUM 50 MCG: 50 TABLET ORAL at 08:50

## 2018-12-10 RX ADMIN — WARFARIN SODIUM 10 MG: 5 TABLET ORAL at 17:46

## 2018-12-10 ASSESSMENT — PAIN SCALES - GENERAL
PAINLEVEL_OUTOF10: 0
PAINLEVEL_OUTOF10: 0
PAINLEVEL_OUTOF10: 3
PAINLEVEL_OUTOF10: 0
PAINLEVEL_OUTOF10: 8

## 2018-12-11 LAB
GLUCOSE BLD-MCNC: 107 MG/DL (ref 60–115)
GLUCOSE BLD-MCNC: 144 MG/DL (ref 60–115)
GLUCOSE BLD-MCNC: 151 MG/DL (ref 60–115)
GLUCOSE BLD-MCNC: 158 MG/DL (ref 60–115)
INR BLD: 1.1
PERFORMED ON: ABNORMAL
PERFORMED ON: NORMAL
PROTHROMBIN TIME: 11.5 SEC (ref 9–11.5)

## 2018-12-11 PROCEDURE — 6370000000 HC RX 637 (ALT 250 FOR IP): Performed by: INTERNAL MEDICINE

## 2018-12-11 PROCEDURE — 1210000000 HC MED SURG R&B

## 2018-12-11 PROCEDURE — 6360000002 HC RX W HCPCS: Performed by: INTERNAL MEDICINE

## 2018-12-11 PROCEDURE — 85610 PROTHROMBIN TIME: CPT

## 2018-12-11 PROCEDURE — 2580000003 HC RX 258: Performed by: INTERNAL MEDICINE

## 2018-12-11 PROCEDURE — 94640 AIRWAY INHALATION TREATMENT: CPT

## 2018-12-11 PROCEDURE — 2700000000 HC OXYGEN THERAPY PER DAY

## 2018-12-11 PROCEDURE — C9113 INJ PANTOPRAZOLE SODIUM, VIA: HCPCS | Performed by: INTERNAL MEDICINE

## 2018-12-11 RX ORDER — FUROSEMIDE 10 MG/ML
40 INJECTION INTRAMUSCULAR; INTRAVENOUS ONCE
Status: COMPLETED | OUTPATIENT
Start: 2018-12-11 | End: 2018-12-11

## 2018-12-11 RX ORDER — POTASSIUM CHLORIDE 20 MEQ/1
40 TABLET, EXTENDED RELEASE ORAL ONCE
Status: COMPLETED | OUTPATIENT
Start: 2018-12-11 | End: 2018-12-11

## 2018-12-11 RX ADMIN — FOLIC ACID 1 MG: 1 TABLET ORAL at 08:36

## 2018-12-11 RX ADMIN — INSULIN GLARGINE 10 UNITS: 100 INJECTION, SOLUTION SUBCUTANEOUS at 22:44

## 2018-12-11 RX ADMIN — Medication 10 ML: at 10:19

## 2018-12-11 RX ADMIN — LEVOTHYROXINE SODIUM 50 MCG: 50 TABLET ORAL at 06:42

## 2018-12-11 RX ADMIN — MORPHINE SULFATE 4 MG: 4 INJECTION, SOLUTION INTRAMUSCULAR; INTRAVENOUS at 15:30

## 2018-12-11 RX ADMIN — ONDANSETRON 4 MG: 2 INJECTION INTRAMUSCULAR; INTRAVENOUS at 15:30

## 2018-12-11 RX ADMIN — BISACODYL 20 MG: 5 TABLET, COATED ORAL at 22:34

## 2018-12-11 RX ADMIN — POTASSIUM CHLORIDE 40 MEQ: 20 TABLET, EXTENDED RELEASE ORAL at 11:40

## 2018-12-11 RX ADMIN — POLYETHYLENE GLYCOL 3350 34 G: 17 POWDER, FOR SOLUTION ORAL at 22:34

## 2018-12-11 RX ADMIN — PANTOPRAZOLE SODIUM 40 MG: 40 INJECTION, POWDER, FOR SOLUTION INTRAVENOUS at 08:36

## 2018-12-11 RX ADMIN — POLYETHYLENE GLYCOL 3350 34 G: 17 POWDER, FOR SOLUTION ORAL at 08:36

## 2018-12-11 RX ADMIN — DOCUSATE SODIUM 100 MG: 100 CAPSULE, LIQUID FILLED ORAL at 22:34

## 2018-12-11 RX ADMIN — Medication 10 ML: at 08:36

## 2018-12-11 RX ADMIN — MORPHINE SULFATE 4 MG: 4 INJECTION, SOLUTION INTRAMUSCULAR; INTRAVENOUS at 22:35

## 2018-12-11 RX ADMIN — DOCUSATE SODIUM 100 MG: 100 CAPSULE, LIQUID FILLED ORAL at 08:36

## 2018-12-11 RX ADMIN — Medication 10 ML: at 22:35

## 2018-12-11 RX ADMIN — WARFARIN SODIUM 10 MG: 5 TABLET ORAL at 17:47

## 2018-12-11 RX ADMIN — DEXAMETHASONE 2 MG: 2 TABLET ORAL at 22:35

## 2018-12-11 RX ADMIN — FUROSEMIDE 40 MG: 10 INJECTION, SOLUTION INTRAMUSCULAR; INTRAVENOUS at 10:18

## 2018-12-11 RX ADMIN — IPRATROPIUM BROMIDE AND ALBUTEROL SULFATE 3 ML: 2.5; .5 SOLUTION RESPIRATORY (INHALATION) at 10:48

## 2018-12-11 RX ADMIN — MUPIROCIN: 20 OINTMENT TOPICAL at 08:42

## 2018-12-11 RX ADMIN — Medication 10 ML: at 08:40

## 2018-12-11 ASSESSMENT — PAIN SCALES - GENERAL
PAINLEVEL_OUTOF10: 2
PAINLEVEL_OUTOF10: 8
PAINLEVEL_OUTOF10: 8

## 2018-12-11 NOTE — PLAN OF CARE
Problem: Pain:  Goal: Pain level will decrease  Pain level will decrease   Outcome: Ongoing      Problem: Mobility - Impaired:  Goal: Mobility will improve  Mobility will improve   Outcome: Ongoing      Problem: Risk for Impaired Skin Integrity  Goal: Tissue integrity - skin and mucous membranes  Structural intactness and normal physiological function of skin and  mucous membranes.    Outcome: Ongoing

## 2018-12-12 ENCOUNTER — APPOINTMENT (OUTPATIENT)
Dept: GENERAL RADIOLOGY | Age: 53
DRG: 542 | End: 2018-12-12
Payer: COMMERCIAL

## 2018-12-12 LAB
ANION GAP SERPL CALCULATED.3IONS-SCNC: 7 MEQ/L (ref 7–13)
BASOPHILS ABSOLUTE: 0 K/UL (ref 0–0.2)
BASOPHILS RELATIVE PERCENT: 0.3 %
BUN BLDV-MCNC: 19 MG/DL (ref 6–20)
CALCIUM SERPL-MCNC: 9.6 MG/DL (ref 8.6–10.2)
CHLORIDE BLD-SCNC: 98 MEQ/L (ref 98–107)
CO2: 36 MEQ/L (ref 22–29)
CREAT SERPL-MCNC: 0.76 MG/DL (ref 0.7–1.2)
EOSINOPHILS ABSOLUTE: 0 K/UL (ref 0–0.7)
EOSINOPHILS RELATIVE PERCENT: 0.8 %
GFR AFRICAN AMERICAN: >60
GFR NON-AFRICAN AMERICAN: >60
GLUCOSE BLD-MCNC: 109 MG/DL (ref 60–115)
GLUCOSE BLD-MCNC: 119 MG/DL (ref 74–109)
GLUCOSE BLD-MCNC: 127 MG/DL (ref 60–115)
GLUCOSE BLD-MCNC: 129 MG/DL (ref 60–115)
GLUCOSE BLD-MCNC: 172 MG/DL (ref 60–115)
HCT VFR BLD CALC: 38.9 % (ref 42–52)
HEMOGLOBIN: 12.5 G/DL (ref 14–18)
INR BLD: 1.1
LYMPHOCYTES ABSOLUTE: 0.9 K/UL (ref 1–4.8)
LYMPHOCYTES RELATIVE PERCENT: 30.1 %
MAGNESIUM: 2 MG/DL (ref 1.7–2.3)
MCH RBC QN AUTO: 26.1 PG (ref 27–31.3)
MCHC RBC AUTO-ENTMCNC: 32.1 % (ref 33–37)
MCV RBC AUTO: 81.2 FL (ref 80–100)
MONOCYTES ABSOLUTE: 0.3 K/UL (ref 0.2–0.8)
MONOCYTES RELATIVE PERCENT: 8.5 %
NEUTROPHILS ABSOLUTE: 1.8 K/UL (ref 1.4–6.5)
NEUTROPHILS RELATIVE PERCENT: 60.3 %
PDW BLD-RTO: 17.5 % (ref 11.5–14.5)
PERFORMED ON: ABNORMAL
PERFORMED ON: NORMAL
PHOSPHORUS: 3.8 MG/DL (ref 2.5–4.5)
PLATELET # BLD: 71 K/UL (ref 130–400)
PLATELET SLIDE REVIEW: ABNORMAL
POTASSIUM SERPL-SCNC: 5.2 MEQ/L (ref 3.5–5.1)
PROTHROMBIN TIME: 10.9 SEC (ref 9–11.5)
RBC # BLD: 4.79 M/UL (ref 4.7–6.1)
SLIDE REVIEW: ABNORMAL
SODIUM BLD-SCNC: 141 MEQ/L (ref 132–144)
WBC # BLD: 3 K/UL (ref 4.8–10.8)

## 2018-12-12 PROCEDURE — 85610 PROTHROMBIN TIME: CPT

## 2018-12-12 PROCEDURE — 97110 THERAPEUTIC EXERCISES: CPT

## 2018-12-12 PROCEDURE — 80048 BASIC METABOLIC PNL TOTAL CA: CPT

## 2018-12-12 PROCEDURE — 84100 ASSAY OF PHOSPHORUS: CPT

## 2018-12-12 PROCEDURE — 85025 COMPLETE CBC W/AUTO DIFF WBC: CPT

## 2018-12-12 PROCEDURE — 2700000000 HC OXYGEN THERAPY PER DAY

## 2018-12-12 PROCEDURE — 83735 ASSAY OF MAGNESIUM: CPT

## 2018-12-12 PROCEDURE — 71045 X-RAY EXAM CHEST 1 VIEW: CPT

## 2018-12-12 PROCEDURE — 6360000002 HC RX W HCPCS: Performed by: INTERNAL MEDICINE

## 2018-12-12 PROCEDURE — C9113 INJ PANTOPRAZOLE SODIUM, VIA: HCPCS | Performed by: INTERNAL MEDICINE

## 2018-12-12 PROCEDURE — 2580000003 HC RX 258: Performed by: INTERNAL MEDICINE

## 2018-12-12 PROCEDURE — 6370000000 HC RX 637 (ALT 250 FOR IP): Performed by: INTERNAL MEDICINE

## 2018-12-12 PROCEDURE — 1210000000 HC MED SURG R&B

## 2018-12-12 RX ORDER — FUROSEMIDE 10 MG/ML
40 INJECTION INTRAMUSCULAR; INTRAVENOUS 2 TIMES DAILY
Status: DISCONTINUED | OUTPATIENT
Start: 2018-12-12 | End: 2018-12-12 | Stop reason: CLARIF

## 2018-12-12 RX ORDER — FUROSEMIDE 10 MG/ML
40 INJECTION INTRAMUSCULAR; INTRAVENOUS DAILY
Status: DISCONTINUED | OUTPATIENT
Start: 2018-12-14 | End: 2018-12-14 | Stop reason: HOSPADM

## 2018-12-12 RX ORDER — MORPHINE SULFATE 4 MG/ML
4 INJECTION, SOLUTION INTRAMUSCULAR; INTRAVENOUS
Status: DISCONTINUED | OUTPATIENT
Start: 2018-12-12 | End: 2018-12-13

## 2018-12-12 RX ORDER — FUROSEMIDE 10 MG/ML
40 INJECTION INTRAMUSCULAR; INTRAVENOUS 2 TIMES DAILY
Status: COMPLETED | OUTPATIENT
Start: 2018-12-12 | End: 2018-12-13

## 2018-12-12 RX ADMIN — Medication 10 ML: at 08:53

## 2018-12-12 RX ADMIN — FOLIC ACID 1 MG: 1 TABLET ORAL at 08:52

## 2018-12-12 RX ADMIN — MORPHINE SULFATE 4 MG: 4 INJECTION, SOLUTION INTRAMUSCULAR; INTRAVENOUS at 11:56

## 2018-12-12 RX ADMIN — ONDANSETRON 4 MG: 2 INJECTION INTRAMUSCULAR; INTRAVENOUS at 12:00

## 2018-12-12 RX ADMIN — FUROSEMIDE 40 MG: 10 INJECTION, SOLUTION INTRAMUSCULAR; INTRAVENOUS at 17:20

## 2018-12-12 RX ADMIN — MORPHINE SULFATE 4 MG: 4 INJECTION, SOLUTION INTRAMUSCULAR; INTRAVENOUS at 15:06

## 2018-12-12 RX ADMIN — MORPHINE SULFATE 4 MG: 4 INJECTION, SOLUTION INTRAMUSCULAR; INTRAVENOUS at 22:06

## 2018-12-12 RX ADMIN — POLYETHYLENE GLYCOL 3350 34 G: 17 POWDER, FOR SOLUTION ORAL at 08:52

## 2018-12-12 RX ADMIN — DOCUSATE SODIUM 100 MG: 100 CAPSULE, LIQUID FILLED ORAL at 20:07

## 2018-12-12 RX ADMIN — Medication 10 ML: at 09:28

## 2018-12-12 RX ADMIN — MORPHINE SULFATE 4 MG: 4 INJECTION, SOLUTION INTRAMUSCULAR; INTRAVENOUS at 20:07

## 2018-12-12 RX ADMIN — Medication 10 ML: at 20:08

## 2018-12-12 RX ADMIN — DOCUSATE SODIUM 100 MG: 100 CAPSULE, LIQUID FILLED ORAL at 08:52

## 2018-12-12 RX ADMIN — DEXAMETHASONE 2 MG: 2 TABLET ORAL at 20:07

## 2018-12-12 RX ADMIN — MUPIROCIN: 20 OINTMENT TOPICAL at 08:52

## 2018-12-12 RX ADMIN — INSULIN GLARGINE 10 UNITS: 100 INJECTION, SOLUTION SUBCUTANEOUS at 21:54

## 2018-12-12 RX ADMIN — PANTOPRAZOLE SODIUM 40 MG: 40 INJECTION, POWDER, FOR SOLUTION INTRAVENOUS at 08:52

## 2018-12-12 RX ADMIN — FUROSEMIDE 40 MG: 10 INJECTION, SOLUTION INTRAMUSCULAR; INTRAVENOUS at 08:52

## 2018-12-12 RX ADMIN — BISACODYL 20 MG: 5 TABLET, COATED ORAL at 08:59

## 2018-12-12 RX ADMIN — MORPHINE SULFATE 4 MG: 4 INJECTION, SOLUTION INTRAMUSCULAR; INTRAVENOUS at 02:29

## 2018-12-12 RX ADMIN — LEVOTHYROXINE SODIUM 50 MCG: 50 TABLET ORAL at 06:48

## 2018-12-12 RX ADMIN — MORPHINE SULFATE 4 MG: 4 INJECTION, SOLUTION INTRAMUSCULAR; INTRAVENOUS at 06:48

## 2018-12-12 RX ADMIN — WARFARIN SODIUM 10 MG: 5 TABLET ORAL at 17:20

## 2018-12-12 RX ADMIN — ONDANSETRON 4 MG: 2 INJECTION INTRAMUSCULAR; INTRAVENOUS at 20:07

## 2018-12-12 ASSESSMENT — PAIN SCALES - GENERAL
PAINLEVEL_OUTOF10: 8
PAINLEVEL_OUTOF10: 7
PAINLEVEL_OUTOF10: 8

## 2018-12-12 ASSESSMENT — PAIN DESCRIPTION - LOCATION: LOCATION: ARM

## 2018-12-12 ASSESSMENT — PAIN DESCRIPTION - ORIENTATION: ORIENTATION: RIGHT

## 2018-12-12 ASSESSMENT — PAIN DESCRIPTION - PAIN TYPE: TYPE: CHRONIC PAIN

## 2018-12-13 LAB
GLUCOSE BLD-MCNC: 118 MG/DL (ref 60–115)
GLUCOSE BLD-MCNC: 80 MG/DL (ref 60–115)
GLUCOSE BLD-MCNC: 88 MG/DL (ref 60–115)
GLUCOSE BLD-MCNC: 91 MG/DL (ref 60–115)
INR BLD: 1.1
PERFORMED ON: ABNORMAL
PERFORMED ON: NORMAL
PROTHROMBIN TIME: 11.4 SEC (ref 9–11.5)
REJECTED TEST: NORMAL

## 2018-12-13 PROCEDURE — 6370000000 HC RX 637 (ALT 250 FOR IP): Performed by: ANESTHESIOLOGY

## 2018-12-13 PROCEDURE — 97110 THERAPEUTIC EXERCISES: CPT

## 2018-12-13 PROCEDURE — 1210000000 HC MED SURG R&B

## 2018-12-13 PROCEDURE — 85610 PROTHROMBIN TIME: CPT

## 2018-12-13 PROCEDURE — 6370000000 HC RX 637 (ALT 250 FOR IP): Performed by: INTERNAL MEDICINE

## 2018-12-13 PROCEDURE — 2700000000 HC OXYGEN THERAPY PER DAY

## 2018-12-13 PROCEDURE — 6360000002 HC RX W HCPCS: Performed by: INTERNAL MEDICINE

## 2018-12-13 PROCEDURE — 97535 SELF CARE MNGMENT TRAINING: CPT

## 2018-12-13 PROCEDURE — 6360000002 HC RX W HCPCS: Performed by: ANESTHESIOLOGY

## 2018-12-13 PROCEDURE — 2580000003 HC RX 258: Performed by: INTERNAL MEDICINE

## 2018-12-13 PROCEDURE — C9113 INJ PANTOPRAZOLE SODIUM, VIA: HCPCS | Performed by: INTERNAL MEDICINE

## 2018-12-13 RX ORDER — GABAPENTIN 400 MG/1
400 CAPSULE ORAL 3 TIMES DAILY
Status: DISCONTINUED | OUTPATIENT
Start: 2018-12-13 | End: 2018-12-13

## 2018-12-13 RX ORDER — GABAPENTIN 300 MG/1
600 CAPSULE ORAL EVERY 6 HOURS SCHEDULED
Status: DISCONTINUED | OUTPATIENT
Start: 2018-12-13 | End: 2018-12-14 | Stop reason: HOSPADM

## 2018-12-13 RX ORDER — ACETAMINOPHEN 325 MG/1
650 TABLET ORAL EVERY 6 HOURS SCHEDULED
Status: DISCONTINUED | OUTPATIENT
Start: 2018-12-14 | End: 2018-12-14 | Stop reason: HOSPADM

## 2018-12-13 RX ORDER — HYDROMORPHONE HYDROCHLORIDE 2 MG/1
4 TABLET ORAL EVERY 4 HOURS PRN
Status: DISCONTINUED | OUTPATIENT
Start: 2018-12-13 | End: 2018-12-14 | Stop reason: HOSPADM

## 2018-12-13 RX ADMIN — HYDROMORPHONE HYDROCHLORIDE 4 MG: 2 TABLET ORAL at 20:46

## 2018-12-13 RX ADMIN — Medication 10 ML: at 20:55

## 2018-12-13 RX ADMIN — GABAPENTIN 600 MG: 300 CAPSULE ORAL at 20:46

## 2018-12-13 RX ADMIN — MORPHINE SULFATE 4 MG: 4 INJECTION, SOLUTION INTRAMUSCULAR; INTRAVENOUS at 00:24

## 2018-12-13 RX ADMIN — BISACODYL 20 MG: 5 TABLET, COATED ORAL at 10:22

## 2018-12-13 RX ADMIN — GABAPENTIN 400 MG: 400 CAPSULE ORAL at 08:19

## 2018-12-13 RX ADMIN — ALTEPLASE 1 MG: 2.2 INJECTION, POWDER, LYOPHILIZED, FOR SOLUTION INTRAVENOUS at 13:01

## 2018-12-13 RX ADMIN — DOCUSATE SODIUM 100 MG: 100 CAPSULE, LIQUID FILLED ORAL at 08:19

## 2018-12-13 RX ADMIN — PANTOPRAZOLE SODIUM 40 MG: 40 INJECTION, POWDER, FOR SOLUTION INTRAVENOUS at 08:19

## 2018-12-13 RX ADMIN — DEXAMETHASONE 2 MG: 2 TABLET ORAL at 20:46

## 2018-12-13 RX ADMIN — HYDROMORPHONE HYDROCHLORIDE 1 MG: 1 INJECTION, SOLUTION INTRAMUSCULAR; INTRAVENOUS; SUBCUTANEOUS at 17:11

## 2018-12-13 RX ADMIN — MORPHINE SULFATE 4 MG: 4 INJECTION, SOLUTION INTRAMUSCULAR; INTRAVENOUS at 05:54

## 2018-12-13 RX ADMIN — ALTEPLASE 1 MG: 2.2 INJECTION, POWDER, LYOPHILIZED, FOR SOLUTION INTRAVENOUS at 13:04

## 2018-12-13 RX ADMIN — FUROSEMIDE 40 MG: 10 INJECTION, SOLUTION INTRAMUSCULAR; INTRAVENOUS at 08:19

## 2018-12-13 RX ADMIN — Medication 10 ML: at 20:56

## 2018-12-13 RX ADMIN — INSULIN GLARGINE 10 UNITS: 100 INJECTION, SOLUTION SUBCUTANEOUS at 20:48

## 2018-12-13 RX ADMIN — WARFARIN SODIUM 10 MG: 5 TABLET ORAL at 17:11

## 2018-12-13 RX ADMIN — LEVOTHYROXINE SODIUM 50 MCG: 50 TABLET ORAL at 05:54

## 2018-12-13 RX ADMIN — GABAPENTIN 400 MG: 400 CAPSULE ORAL at 13:47

## 2018-12-13 RX ADMIN — HYDROMORPHONE HYDROCHLORIDE 1 MG: 1 INJECTION, SOLUTION INTRAMUSCULAR; INTRAVENOUS; SUBCUTANEOUS at 08:21

## 2018-12-13 RX ADMIN — HYDROMORPHONE HYDROCHLORIDE 1 MG: 1 INJECTION, SOLUTION INTRAMUSCULAR; INTRAVENOUS; SUBCUTANEOUS at 12:30

## 2018-12-13 RX ADMIN — ONDANSETRON 4 MG: 2 INJECTION INTRAMUSCULAR; INTRAVENOUS at 05:54

## 2018-12-13 RX ADMIN — FUROSEMIDE 40 MG: 10 INJECTION, SOLUTION INTRAMUSCULAR; INTRAVENOUS at 17:12

## 2018-12-13 RX ADMIN — METHYLNALTREXONE BROMIDE 12 MG: 12 INJECTION, SOLUTION SUBCUTANEOUS at 20:46

## 2018-12-13 RX ADMIN — DOCUSATE SODIUM 100 MG: 100 CAPSULE, LIQUID FILLED ORAL at 20:46

## 2018-12-13 RX ADMIN — FOLIC ACID 1 MG: 1 TABLET ORAL at 08:19

## 2018-12-13 RX ADMIN — Medication 10 ML: at 20:48

## 2018-12-13 ASSESSMENT — PAIN DESCRIPTION - DESCRIPTORS: DESCRIPTORS: ACHING

## 2018-12-13 ASSESSMENT — ENCOUNTER SYMPTOMS
DIARRHEA: 0
BACK PAIN: 1
BLOOD IN STOOL: 0
EYES NEGATIVE: 1
NAUSEA: 0
CONSTIPATION: 1
ANAL BLEEDING: 0
ABDOMINAL PAIN: 1
VOMITING: 0
ABDOMINAL DISTENTION: 1
RECTAL PAIN: 0
RESPIRATORY NEGATIVE: 1

## 2018-12-13 ASSESSMENT — PAIN SCALES - GENERAL
PAINLEVEL_OUTOF10: 8
PAINLEVEL_OUTOF10: 8
PAINLEVEL_OUTOF10: 5
PAINLEVEL_OUTOF10: 8
PAINLEVEL_OUTOF10: 6
PAINLEVEL_OUTOF10: 8

## 2018-12-13 ASSESSMENT — PAIN DESCRIPTION - LOCATION: LOCATION: ARM;SHOULDER

## 2018-12-13 ASSESSMENT — PAIN DESCRIPTION - PAIN TYPE: TYPE: CHRONIC PAIN

## 2018-12-13 ASSESSMENT — PAIN DESCRIPTION - FREQUENCY: FREQUENCY: CONTINUOUS

## 2018-12-13 ASSESSMENT — PAIN DESCRIPTION - ORIENTATION: ORIENTATION: RIGHT

## 2018-12-13 NOTE — CARE COORDINATION
MET WITH PATIENT, CONFIRMED PLAN IS DC TO [facility], CALL PLACED TO DR. Jeanine Rain TO NOTIFY THAT PRE CERT WAS OBTAINED.  OK WITH CONSULTANTS TO DC.

## 2018-12-13 NOTE — CONSULTS
(before meals)  lactulose 20 GM/30ML SOLN, Take 30 mLs by mouth 3 times daily  meloxicam (MOBIC) 7.5 MG tablet, Take 7.5 mg by mouth daily  levothyroxine (SYNTHROID) 50 MCG tablet, 20ZET daily  folic acid (FOLVITE) 1 MG tablet, TK 1 T PO QD  warfarin (COUMADIN) 10 MG tablet, 1 tab at bedtime  [] mupirocin (BACTROBAN) 2 % ointment, Apply topically 3 times daily. 22 gram tube generic OK. [DISCONTINUED] tamsulosin (FLOMAX) 0.4 MG capsule, Take 1 capsule by mouth daily  dexamethasone (DECADRON) 2 MG tablet, Take 2 mg by mouth nightly  ipratropium-albuterol (DUONEB) 0.5-2.5 (3) MG/3ML SOLN nebulizer solution, Inhale 1 vial into the lungs every 2 hours as needed for Shortness of Breath  [DISCONTINUED] oxyCODONE (OXY-IR) 30 MG immediate release tablet, Take 20 mg by mouth every 4 hours as needed for Pain. .  [DISCONTINUED] senna (SENOKOT) 8.6 MG tablet, Take 1 tablet by mouth 2 times daily as needed for Constipation  [DISCONTINUED] Methylnaltrexone Bromide (RELISTOR) 150 MG TABS, Take 1 tablet by mouth every morning  [DISCONTINUED] Cabozantinib S-Malate (CABOMETYX) 60 MG TABS, Take 60 mg by mouth daily  torsemide (DEMADEX) 100 MG tablet, Take 100 mg by mouth daily  [DISCONTINUED] ergocalciferol (ERGOCALCIFEROL) 29632 units capsule, Take 50,000 Units by mouth once a week   [DISCONTINUED] linaclotide (LINZESS) 145 MCG capsule, Take 290 mcg by mouth every morning (before breakfast)   [DISCONTINUED] polyethylene glycol (MIRALAX) powder, Take 17 g by mouth daily as needed  amphetamine-dextroamphetamine (ADDERALL) 20 MG tablet, Take 1 tab PO BID  [unfilled]    ALLERGIES:  Codeine and Heparin    COMPLETE REVIEW OF SYSTEMS:  As noted in HPI, 12 point ROS reviewed and otherwise negative. Review of Systems   Constitutional: Positive for activity change, appetite change and fatigue. Negative for chills, diaphoresis, fever and unexpected weight change. Patient is morbidly obese   HENT: Negative. Eyes: Negative. Respiratory: Negative. Cardiovascular: Negative. Gastrointestinal: Positive for abdominal distention, abdominal pain and constipation. Negative for anal bleeding, blood in stool, diarrhea, nausea, rectal pain and vomiting. Endocrine: Negative. Genitourinary: Positive for difficulty urinating and flank pain. Negative for decreased urine volume, discharge, dysuria, enuresis, frequency, genital sores, hematuria, penile pain, penile swelling, scrotal swelling, testicular pain and urgency. Musculoskeletal: Positive for arthralgias, back pain, gait problem, joint swelling, myalgias, neck pain and neck stiffness. Skin: Negative. Allergic/Immunologic: Positive for immunocompromised state. Negative for environmental allergies and food allergies. Neurological: Positive for tremors, weakness and numbness. Negative for dizziness, seizures, syncope, facial asymmetry, speech difficulty, light-headedness and headaches. Hematological: Negative for adenopathy. Bruises/bleeds easily. Psychiatric/Behavioral: Positive for dysphoric mood. Negative for agitation, behavioral problems, confusion, decreased concentration, hallucinations, self-injury, sleep disturbance and suicidal ideas. The patient is nervous/anxious. The patient is not hyperactive. OBJECTIVE  PHYSICAL EXAM:  BP (!) 143/69   Pulse 81   Temp 97.7 °F (36.5 °C) (Oral)   Resp 16   Ht 6' 2\" (1.88 m)   Wt (!) 421 lb 1.3 oz (191 kg)   SpO2 (!) 83%   BMI 54.06 kg/m²   Body mass index is 54.06 kg/m².   CONSTITUTIONAL:  awake, alert, cooperative, no apparent distress, and appears stated age and mildly anxious due to the level of pain as well as seems to be generalized uncomfortable with his position  EYES:  vision intact  ENT:  normocepalic, without obvious abnormality, atraumatic  NECK:  Right para cervical tenderness was limited cervical range of motion, radiating right upper extremity pain was arm movement, diminished sensory however noted THE BRAIN WITHOUT CONTRAST CLINICAL HISTORY:  COORDINATION CHANGES, NEW OR PROGRESSIVE . History metastatic renal cell carcinoma. COMPARISONS:  NONE TECHNIQUE:  Unenhanced brain CT . FINDINGS:    The ventricles are normal in position. No focal abnormalities are seen within the brain parenchyma. There is no evidence of mass effect. No evidence of vasogenic edema. There is no significant atrophy. There is normal grey- white matter differentiation. There is no evidence of hemorrhage. The portions of the paranasal sinuses  visualized are clear. Mastoid air cells visualized are clear. Views of the skull are unremarkable. The orbits are unremarkable. NO EVIDENCE FOR ACUTE INTRACRANIAL PROCESS. NO EVIDENCE METASTATIC DISEASE. All CT scans at this facility use dose modulation, iterative reconstruction, and/or weight based dosing when appropriate to reduce radiation dose to as low as reasonably achievable. Ct Abdomen Pelvis W Iv Contrast Additional Contrast? None    Result Date: 12/3/2018  EXAMINATION:  CT ABDOMEN AND PELVIS WITH IV CONTRAST CLINICAL HISTORY:  ABDOMINAL PAIN AND DISTENTION, CONSTIPATION, EVALUATE FOR BOWEL OBSTRUCTION, SURGICAL HISTORY OF GASTRIC BYPASS SURGERY AND PRIOR RIGHT NEPHRECTOMY COMPARISON:  6/7/2014 TECHNIQUE:  CT abdomen and pelvis is obtained following IV injection of 100 mL of Isovue-300. FINDINGS:  There are postsurgical changes in the abdomen from a previous gastric bypass procedure, cholecystectomy and a right nephrectomy. The liver, spleen, pancreas, adrenal glands and remaining left kidney appear unremarkable. There is no retroperitoneal adenopathy. Abdominal aorta is unremarkable. There is an IVC filter in place. No \"free fluid\", abscess or pneumoperitoneum in abdomen. There is a small fat-containing midline ventral hernia. Nondilated small bowel appears unremarkable and there is no SBO.  Appendix appears normal. There is abundant fecal matter in the colon compatible with [R20.2] 12/05/2018    Weakness of both lower extremities [R29.898] 12/05/2018    History of pulmonary embolism [Z86.711] 12/05/2018    S/P bariatric surgery [Z98.84] 12/05/2018    Chronic pain due to neoplasm [G89.3] 12/05/2018    Encounter for antineoplastic immunotherapy [Z51.12] 12/05/2018    Generalized weakness [R53.1] 12/05/2018       48years old male who is suffering from chronic mostly related to his cancer and complication with metastasis to the spine, also he has major component off neuropathy which could be due to multiple factors including nerve compression with resulting nerve damage in addition also radiation induced neuropathy with associated myelopathy which could be also due to both nerve compression in addition also could be radiation-induced . Patient on Duragesic patch due to feeling on other opioids due to opioid-induced constipation especially sickly to oxycodone, , also has major multiple comorbidity which limited the use of other non-opioid treatment so he is not candidate for Lyrica due to his kidney situation. RECOMMENDATION:  SEE ORDERS      I will start Dilaudid by mouth 4 mg every 4 hours for breakthrough pain to alternate with IV Dilaudid patient may have less-induced constipation was Dilaudid versus oxycodone or morphine      I will continue with his current ear isn't patch 75 µg      I will optimize the dose of Neurontin to 2400 mg daily divided doses 600 mg every 6 hours      Optimize on his non-opioid analgesia including Tylenol 650 scheduled every 6 hours      I will reevaluate to start low-dose Topamax 25 mg twice a day if he continued having neuropathic pain per prominent in his right upper extremities in spite of the above changes      We'll continue to monitor as well as update his medical palliative pain management plan.         SIGNATURE: Graham Seymour MD PATIENT NAME: Avelina Balderas   DATE: December 13, 2018 MRN: [id number]   TIME: 6:53 PM PAGER/CONTACT #:

## 2018-12-14 VITALS
TEMPERATURE: 97.9 F | WEIGHT: 315 LBS | HEART RATE: 69 BPM | RESPIRATION RATE: 19 BRPM | HEIGHT: 74 IN | OXYGEN SATURATION: 97 % | BODY MASS INDEX: 40.43 KG/M2 | SYSTOLIC BLOOD PRESSURE: 116 MMHG | DIASTOLIC BLOOD PRESSURE: 75 MMHG

## 2018-12-14 DIAGNOSIS — R52 PAIN: Primary | ICD-10-CM

## 2018-12-14 DIAGNOSIS — G89.3 CANCER ASSOCIATED PAIN: ICD-10-CM

## 2018-12-14 LAB
ANION GAP SERPL CALCULATED.3IONS-SCNC: 15 MEQ/L (ref 7–13)
BASOPHILS ABSOLUTE: 0 K/UL (ref 0–0.2)
BASOPHILS RELATIVE PERCENT: 0.4 %
BUN BLDV-MCNC: 29 MG/DL (ref 6–20)
CALCIUM SERPL-MCNC: 9.5 MG/DL (ref 8.6–10.2)
CHLORIDE BLD-SCNC: 89 MEQ/L (ref 98–107)
CO2: 34 MEQ/L (ref 22–29)
CREAT SERPL-MCNC: 0.8 MG/DL (ref 0.7–1.2)
EOSINOPHILS ABSOLUTE: 0 K/UL (ref 0–0.7)
EOSINOPHILS RELATIVE PERCENT: 0.7 %
GFR AFRICAN AMERICAN: >60
GFR NON-AFRICAN AMERICAN: >60
GLUCOSE BLD-MCNC: 111 MG/DL (ref 60–115)
GLUCOSE BLD-MCNC: 117 MG/DL (ref 74–109)
GLUCOSE BLD-MCNC: 137 MG/DL (ref 60–115)
GLUCOSE BLD-MCNC: 90 MG/DL (ref 60–115)
HCT VFR BLD CALC: 43.8 % (ref 42–52)
HEMOGLOBIN: 14.1 G/DL (ref 14–18)
INR BLD: 1.3
LYMPHOCYTES ABSOLUTE: 1.4 K/UL (ref 1–4.8)
LYMPHOCYTES RELATIVE PERCENT: 32.1 %
MAGNESIUM: 2 MG/DL (ref 1.7–2.3)
MCH RBC QN AUTO: 26.1 PG (ref 27–31.3)
MCHC RBC AUTO-ENTMCNC: 32.3 % (ref 33–37)
MCV RBC AUTO: 80.9 FL (ref 80–100)
MONOCYTES ABSOLUTE: 0.4 K/UL (ref 0.2–0.8)
MONOCYTES RELATIVE PERCENT: 10.1 %
NEUTROPHILS ABSOLUTE: 2.5 K/UL (ref 1.4–6.5)
NEUTROPHILS RELATIVE PERCENT: 56.7 %
PDW BLD-RTO: 17.7 % (ref 11.5–14.5)
PERFORMED ON: ABNORMAL
PERFORMED ON: NORMAL
PERFORMED ON: NORMAL
PHOSPHORUS: 4.3 MG/DL (ref 2.5–4.5)
PLATELET # BLD: 94 K/UL (ref 130–400)
POTASSIUM SERPL-SCNC: 4.5 MEQ/L (ref 3.5–5.1)
PROTHROMBIN TIME: 12.7 SEC (ref 9–11.5)
RBC # BLD: 5.41 M/UL (ref 4.7–6.1)
SODIUM BLD-SCNC: 138 MEQ/L (ref 132–144)
WBC # BLD: 4.4 K/UL (ref 4.8–10.8)

## 2018-12-14 PROCEDURE — 85610 PROTHROMBIN TIME: CPT

## 2018-12-14 PROCEDURE — 99233 SBSQ HOSP IP/OBS HIGH 50: CPT | Performed by: INTERNAL MEDICINE

## 2018-12-14 PROCEDURE — C9113 INJ PANTOPRAZOLE SODIUM, VIA: HCPCS | Performed by: INTERNAL MEDICINE

## 2018-12-14 PROCEDURE — 97535 SELF CARE MNGMENT TRAINING: CPT

## 2018-12-14 PROCEDURE — 6360000002 HC RX W HCPCS: Performed by: INTERNAL MEDICINE

## 2018-12-14 PROCEDURE — 85025 COMPLETE CBC W/AUTO DIFF WBC: CPT

## 2018-12-14 PROCEDURE — 80048 BASIC METABOLIC PNL TOTAL CA: CPT

## 2018-12-14 PROCEDURE — 2580000003 HC RX 258: Performed by: INTERNAL MEDICINE

## 2018-12-14 PROCEDURE — 83735 ASSAY OF MAGNESIUM: CPT

## 2018-12-14 PROCEDURE — 6370000000 HC RX 637 (ALT 250 FOR IP): Performed by: ANESTHESIOLOGY

## 2018-12-14 PROCEDURE — 2700000000 HC OXYGEN THERAPY PER DAY

## 2018-12-14 PROCEDURE — 6370000000 HC RX 637 (ALT 250 FOR IP): Performed by: INTERNAL MEDICINE

## 2018-12-14 PROCEDURE — 84100 ASSAY OF PHOSPHORUS: CPT

## 2018-12-14 RX ORDER — OMEPRAZOLE 40 MG/1
40 CAPSULE, DELAYED RELEASE ORAL
Qty: 30 CAPSULE | Refills: 0 | Status: SHIPPED | OUTPATIENT
Start: 2018-12-14

## 2018-12-14 RX ORDER — HYDROMORPHONE HYDROCHLORIDE 4 MG/1
4 TABLET ORAL EVERY 6 HOURS PRN
Qty: 12 TABLET | Refills: 0 | Status: SHIPPED | OUTPATIENT
Start: 2018-12-14 | End: 2018-12-17

## 2018-12-14 RX ORDER — HYDROMORPHONE HYDROCHLORIDE 2 MG/1
4 TABLET ORAL ONCE
Qty: 2 TABLET | Refills: 0 | Status: SHIPPED | OUTPATIENT
Start: 2018-12-14 | End: 2018-12-14

## 2018-12-14 RX ORDER — POLYETHYLENE GLYCOL 3350 17 G/17G
34 POWDER, FOR SOLUTION ORAL 2 TIMES DAILY
Qty: 527 G | Refills: 1 | Status: SHIPPED | OUTPATIENT
Start: 2018-12-14 | End: 2019-01-13

## 2018-12-14 RX ORDER — HYDROMORPHONE HYDROCHLORIDE 4 MG/1
4 TABLET ORAL EVERY 4 HOURS PRN
Qty: 90 TABLET | Refills: 0 | Status: SHIPPED | OUTPATIENT
Start: 2018-12-14 | End: 2019-02-05 | Stop reason: SDUPTHER

## 2018-12-14 RX ORDER — NICOTINE POLACRILEX 4 MG
15 LOZENGE BUCCAL PRN
Qty: 45 G | Refills: 1
Start: 2018-12-14

## 2018-12-14 RX ORDER — HYDROMORPHONE HYDROCHLORIDE 4 MG/1
4 TABLET ORAL EVERY 4 HOURS PRN
Qty: 42 TABLET | Refills: 0
Start: 2018-12-14 | End: 2018-12-14

## 2018-12-14 RX ORDER — FENTANYL 75 UG/H
1 PATCH TRANSDERMAL
Qty: 5 PATCH | Refills: 0 | Status: SHIPPED | OUTPATIENT
Start: 2018-12-14 | End: 2018-12-19 | Stop reason: ALTCHOICE

## 2018-12-14 RX ADMIN — FUROSEMIDE 40 MG: 10 INJECTION, SOLUTION INTRAMUSCULAR; INTRAVENOUS at 08:49

## 2018-12-14 RX ADMIN — Medication 10 ML: at 08:49

## 2018-12-14 RX ADMIN — ACETAMINOPHEN 650 MG: 325 TABLET ORAL at 01:31

## 2018-12-14 RX ADMIN — GABAPENTIN 600 MG: 300 CAPSULE ORAL at 12:12

## 2018-12-14 RX ADMIN — DOCUSATE SODIUM 100 MG: 100 CAPSULE, LIQUID FILLED ORAL at 08:49

## 2018-12-14 RX ADMIN — HYDROMORPHONE HYDROCHLORIDE 4 MG: 2 TABLET ORAL at 01:32

## 2018-12-14 RX ADMIN — ACETAMINOPHEN 650 MG: 325 TABLET ORAL at 12:12

## 2018-12-14 RX ADMIN — FOLIC ACID 1 MG: 1 TABLET ORAL at 08:49

## 2018-12-14 RX ADMIN — PANTOPRAZOLE SODIUM 40 MG: 40 INJECTION, POWDER, FOR SOLUTION INTRAVENOUS at 08:49

## 2018-12-14 RX ADMIN — GABAPENTIN 600 MG: 300 CAPSULE ORAL at 06:10

## 2018-12-14 RX ADMIN — LEVOTHYROXINE SODIUM 50 MCG: 50 TABLET ORAL at 06:10

## 2018-12-14 RX ADMIN — ACETAMINOPHEN 650 MG: 325 TABLET ORAL at 06:09

## 2018-12-14 RX ADMIN — HYDROMORPHONE HYDROCHLORIDE 4 MG: 2 TABLET ORAL at 06:10

## 2018-12-14 RX ADMIN — HYDROMORPHONE HYDROCHLORIDE 4 MG: 2 TABLET ORAL at 12:12

## 2018-12-14 RX ADMIN — GABAPENTIN 600 MG: 300 CAPSULE ORAL at 01:32

## 2018-12-14 ASSESSMENT — PAIN SCALES - GENERAL
PAINLEVEL_OUTOF10: 8
PAINLEVEL_OUTOF10: 4
PAINLEVEL_OUTOF10: 6
PAINLEVEL_OUTOF10: 8

## 2018-12-14 ASSESSMENT — PAIN DESCRIPTION - LOCATION: LOCATION: ARM;SHOULDER

## 2018-12-14 ASSESSMENT — ENCOUNTER SYMPTOMS
RESPIRATORY NEGATIVE: 1
BACK PAIN: 1
GASTROINTESTINAL NEGATIVE: 1
EYES NEGATIVE: 1

## 2018-12-14 ASSESSMENT — PAIN DESCRIPTION - ORIENTATION: ORIENTATION: RIGHT

## 2018-12-14 ASSESSMENT — PAIN DESCRIPTION - DESCRIPTORS: DESCRIPTORS: ACHING

## 2018-12-14 ASSESSMENT — PAIN DESCRIPTION - PAIN TYPE: TYPE: CHRONIC PAIN

## 2018-12-14 NOTE — PROGRESS NOTES
Neurology Follow up    SUBJECTIVE:  NO Headache, double vision,blurry vision,difficulty with speech,difficulty with swallowing  Significnt leg weakness/ numbness  PHYSICAL EXAM:    /62   Pulse 66   Temp 97.5 °F (36.4 °C)   Resp 18   Ht 6' 2\" (1.88 m)   Wt (!) 421 lb 1.3 oz (191 kg)   SpO2 100%   BMI 54.06 kg/m²   General Appearance:      Mental Status Exam:             Level of Alertness:   awake            Orientation:   person, place, time                      Attention/Concentration:  normal            Language:  normal      Funduscopic Exam:     Cranial Nerves          Cranial nerve III           Pupils:  equal, round, reactive to light      Cranial nerves III, IV, VI           Extraocular Movements: intact        Motor:    Drift:  absent  Motor exam is symmetrical 3 out of 5 all extremities bilaterally  Tone:  normal  Abnormal Movements:  absent            Sensory:        Pinprick             Right Upper Extremity: decreased level upti t6            Left Upper Extremity:  normal             Right Lower Extremity:  normal             Left Lower Extremity:  normal           Vibration                         Touch            Proprioception                 Coordination:           Finger/Nose   Right:  normal              Left:  normal                  Rapid Alternating Movements              Right:  normal              Left:  normal          Gait:                       Casual:  Not tested                      Romberg:          Reflexes:             Deep Tendon Reflexes:             Reflexes are 2 +             Plantar response:                Right:  downgoing               Left:  downgoing    Vascular:  Cardiac Exam:  normal         No results found.     Recent Labs      12/05/18 1813 12/06/18   0629  12/07/18   0716   WBC  5.0  4.7*  4.9   HGB  14.2  13.3*  13.9*   PLT  80*  87*  44*     Recent Labs      12/05/18 1813 12/06/18   0629  12/07/18   0716   NA  135  139  139   K  3.8  3.5  4.5   CL
Neurology Follow up    SUBJECTIVE:  NO Headache, double vision,blurry vision,difficulty with speech,difficulty with swallowing  Significnt leg weakness/ numbness  PHYSICAL EXAM:    /65   Pulse 61   Temp 98.1 °F (36.7 °C) (Axillary)   Resp 18   Ht 6' 2\" (1.88 m)   Wt (!) 421 lb 1.3 oz (191 kg)   SpO2 98%   BMI 54.06 kg/m²   General Appearance:      Mental Status Exam:             Level of Alertness:   awake            Orientation:   person, place, time                      Attention/Concentration:  normal            Language:  normal      Funduscopic Exam:     Cranial Nerves          Cranial nerve III           Pupils:  equal, round, reactive to light      Cranial nerves III, IV, VI           Extraocular Movements: intact        Motor:    Drift:  absent  Motor exam is symmetrical 3 out of 5 all extremities bilaterally, no change  Tone:  normal  Abnormal Movements:  absent            Sensory:        Pinprick             Right Upper Extremity: decreased level upti t6            Left Upper Extremity:  normal             Right Lower Extremity:  normal             Left Lower Extremity:  normal           Vibration                         Touch            Proprioception                 Coordination:           Finger/Nose   Right:  normal              Left:  normal                  Rapid Alternating Movements              Right:  normal              Left:  normal          Gait:                       Casual:  Not tested                      Romberg:          Reflexes:             Deep Tendon Reflexes:             Reflexes are 2 +             Plantar response:                Right:  downgoing               Left:  downgoing    Vascular:  Cardiac Exam:  normal         No results found.     Recent Labs      12/05/18 1813 12/06/18   0629  12/07/18   0716   WBC  5.0  4.7*  4.9   HGB  14.2  13.3*  13.9*   PLT  80*  87*  44*     Recent Labs      12/05/18 1813 12/06/18   0629  12/07/18   0716   NA  135  139  139   K
Patient resting comfortably in bed, assessment and vitals obtained and stable, evening meds given without difficulty, patient given a snack, patient denies any further needs at this time, bed in low position, fall precautions in place, call bell within reach, will continue to monitor. .Electronically signed by Ania Miller RN on 12/13/2018 at 11:46 PM
Physical Therapy  Facility/Department: [facility] [id number]      PT evaluation attempted 12/6/18, at 8:37 AM, however this patient status is currently observation. Per facility protocol, PT evaluation and treatment orders for patients in observation status must include a PT specific diagnosis (i.e. \"difficulty ambulating\", \"lack of coordination\", etc.) These order specifications may be added to the \"comments\" section of the PT evaluation and treatment order. Requested updated Physical Therapy orders from referring provider via \"sticky note\". Coordination team notified.      We thank you for your referral!    [facility]) Physical Therapy Department    Electronically signed by Jorge Alberto Roth PT on 12/6/18 at 8:37 AM
Physical Therapy Med Surg Daily Treatment Note  Facility/Department: [facility]  Room: [place] [address]       NAME: Janine Arvizu  : 1965 (48 y.o.)  MRN: [id number]  CODE STATUS: Full Code    Date of Service: 2018    Patient Diagnosis(es): Unable to ambulate [R26.2]  Unable to ambulate [R26.2]  Weakness [R53.1]  Dehydration [E86.0]   Chief Complaint   Patient presents with    Extremity Weakness     MARY LEGS ongoing     Patient Active Problem List    Diagnosis Date Noted    Dehydration 2018    Unable to ambulate 2018    Renal cell carcinoma of right kidney ([address] 2018    Status post nephrectomy 2018    S/P radiotherapy 2018    Cervical radiculopathy due to neoplasm 2018    Cervical disc disorder at C6-C7 level with radiculopathy 2018    Morbid obesity ([address] 2018    COPD (chronic obstructive pulmonary disease) ([address] 2018    MARIBEL on CPAP 2018    Paresthesia of right upper extremity 2018    Weakness of both lower extremities 2018    History of pulmonary embolism 2018    S/P bariatric surgery 2018    Chronic pain due to neoplasm 2018    Encounter for antineoplastic immunotherapy 2018    Generalized weakness 2018    Carpal tunnel syndrome of right wrist 2017    Tardy ulnar nerve palsy 2017    Rhomboid muscle strain 2017    Non morbid obesity due to excess calories 2017    Strain of thoracic region 2017        Past Medical History:   Diagnosis Date    ADHD     B12 deficiency     r/t gastric bypass surgery    Cellulitis     Hypothyroid     Malignant neoplasm of kidney ([address]     Pulmonary embolism ([address]     Secondary malignant neoplasm of bone and bone marrow ([address]      Past Surgical History:   Procedure Laterality Date    CHOLECYSTECTOMY      GASTRIC BYPASS SURGERY N/A     KIDNEY REMOVAL Right
Physical Therapy Missed Treatment   Facility/Department: [facility] MED SURG [id number]    NAME: Milka Salazar  Patient Status:   : 1965 (48 y.o.)  MRN: [id number]  Account: [de-identified]  Gender: male        [] Patient Declines PT Treatment            [x] Patient Unavailable: pt needed personal care, will return. Will attempt PT Treatment again at earliest convenience.         Electronically signed by Nara Hahn PTA on 18 at 9:20 AM
Progress Note  12/11/2018 10:29 AM  Subjective:   Admit Date: 12/5/2018  PCP: Shruthi Macias MD  Interval History: Much more awake and alert , still with SOB and increasing bilateral LE and right UE weakness. Discussed with Neurology, awaiting chemotherapy as recommended by oncology and awaiting delivery to home. Bipap when not eating and IVF dc'd and Lasix 40 mg ivp x 1 given. Dulcolax 20 mg po x 1 ordered. Right arm swelling noted PT/INR checked. DIET GENERAL; Intake/Output Summary (Last 24 hours) at 12/11/18 1029  Last data filed at 12/11/18 0549   Gross per 24 hour   Intake              987 ml   Output              800 ml   Net              187 ml     Medications:      dextrose        [START ON 12/12/2018] bisacodyl  20 mg Oral Once    dexamethasone  2 mg Oral Nightly    polyethylene glycol  34 g Oral BID    sodium chloride flush  10 mL Intravenous 2 times per day    pantoprazole  40 mg Intravenous Daily    And    sodium chloride (PF)  10 mL Intravenous Daily    insulin glargine  10 Units Subcutaneous Nightly    insulin lispro  0-12 Units Subcutaneous TID WC    insulin lispro  0-6 Units Subcutaneous Nightly    insulin lispro  4 Units Subcutaneous TID WC    sodium chloride flush  10 mL Intravenous 2 times per day    docusate sodium  100 mg Oral BID    fentaNYL  1 patch Transdermal B11K    folic acid  1 mg Oral Daily    levothyroxine  50 mcg Oral Daily    mupirocin   Topical Daily    warfarin  10 mg Oral Daily     Recent Labs      12/09/18   0450  12/10/18   0323   WBC  2.9*  3.4*   HGB  12.5*  12.9*   PLT  66*  66*     Recent Labs      12/09/18   0450  12/10/18   0323   NA  138  139   K  4.9  5.4*   CL  101  102   CO2  33*  31*   BUN  20  18   CREATININE  0.78  0.67*   GLUCOSE  227*  222*     No results for input(s): AST, ALT, ALB, BILITOT, ALKPHOS in the last 72 hours. Troponin T: No results for input(s): TROPONINI in the last 72 hours.   Pro-BNP: No results for input(s): BNP in the
Pt is alert and oriented x4. He c/o numbess, tingling, and weakness to right arm. He is unable to walk at this time. Pt is in bariatric bed. Skin is intact except for a skin tear to right shin. Dressing in place. Pt reports no bm in 8 days. Colace and miralax given. Bowel sounds hypoactive.
Pt refused blood draw. PICC not getting blood return from wither port.  Order for cath payam received from Dr. Paddy Cochran signed by Marylu Magana RN on 12/13/2018 at 7:33 AM
101   CO2  29  29  33*   BUN  26*  22*  20   CREATININE  0.82  0.61*  0.78   GLUCOSE  232*  144*  227*     No results for input(s): AST, ALT, ALB, BILITOT, ALKPHOS in the last 72 hours. Troponin T: No results for input(s): TROPONINI in the last 72 hours. Pro-BNP: No results for input(s): BNP in the last 72 hours. INR:   Recent Labs      12/07/18   0716  12/08/18   1848   INR  1.6  1.2       Objective:     Vitals:    12/08/18 2025 12/08/18 2054 12/09/18 0757 12/09/18 2015   BP:  129/63 123/62 105/73   Pulse: 73 74 66 173   Resp: 18 18 18 20   Temp: 97.7 °F (36.5 °C) 97.7 °F (36.5 °C) 97.5 °F (36.4 °C) 97.7 °F (36.5 °C)   TempSrc: Oral Oral  Oral   SpO2: 98%  100%    Weight:       Height:         General appearance: alert and cooperative with exam  Lungs: clear to auscultation bilaterally  Heart: regular rate and rhythm, S1, S2 normal, no murmur, click, rub or gallop  Abdomen: soft, non-tender; bowel sounds normal; no masses,  no organomegaly  Extremities: extremities normal, atraumatic, no cyanosis or edema  Neurologic: No obvious focal neurologic deficits. Assessment and Plan:     Advance Directive: Full Code  DVT prophylaxis with enoxaparin 40 mg sub-Q daily. Discharge planning: Rehab    Active Problems:    Unable to ambulate    Renal cell carcinoma of right kidney (HCC)    Status post nephrectomy    S/P radiotherapy    Cervical radiculopathy due to neoplasm    Cervical disc disorder at C6-C7 level with radiculopathy    Morbid obesity (HCC)    COPD (chronic obstructive pulmonary disease) (HCC)    MARIBEL on CPAP    Paresthesia of right upper extremity    Weakness of both lower extremities    History of pulmonary embolism    S/P bariatric surgery    Chronic pain due to neoplasm    Encounter for antineoplastic immunotherapy    Generalized weakness    Dehydration  Resolved Problems:    * No resolved hospital problems. *  Plan:  1. Acute Rehab/LTAC  2. Start New chemotherapy meds as available to patient  3.
98   CO2  36*   BUN  19   CREATININE  0.76   GLUCOSE  119*     No results for input(s): AST, ALT, ALB, BILITOT, ALKPHOS in the last 72 hours. Troponin T: No results for input(s): TROPONINI in the last 72 hours. Pro-BNP: No results for input(s): BNP in the last 72 hours. INR:   Recent Labs      12/11/18   1126  12/12/18   0600  12/13/18   1400   INR  1.1  1.1  1.1       Objective:     Vitals:    12/12/18 1117 12/12/18 1956 12/13/18 0729 12/13/18 1949   BP: 131/76 138/76 (!) 143/69 114/71   Pulse: 98 86 81 91   Resp: 16 16 16    Temp: 97.7 °F (36.5 °C) 98.6 °F (37 °C) 97.7 °F (36.5 °C) 97.5 °F (36.4 °C)   TempSrc: Oral Oral Oral    SpO2: (!) 85% 98% (!) 83% (!) 89%   Weight:       Height:         General appearance: alert and cooperative with exam  Lungs: clear to auscultation bilaterally  Heart: regular rate and rhythm, S1, S2 normal, no murmur, click, rub or gallop  Abdomen: soft, non-tender; bowel sounds normal; no masses,  no organomegaly  Extremities: extremities normal, atraumatic, no cyanosis or edema  Neurologic: No obvious focal neurologic deficits. Assessment and Plan:       Advance Directive: Full Code  DVT prophylaxis with enoxaparin 40 mg sub-Q daily. Discharge planning: SNF    Active Problems:    Unable to ambulate    Renal cell carcinoma of right kidney (HCC)    Status post nephrectomy    S/P radiotherapy    Cervical radiculopathy due to neoplasm    Cervical disc disorder at C6-C7 level with radiculopathy    Morbid obesity (HCC)    COPD (chronic obstructive pulmonary disease) (HCC)    MARIBEL on CPAP    Paresthesia of right upper extremity    Weakness of both lower extremities    History of pulmonary embolism    S/P bariatric surgery    Chronic pain due to neoplasm    Encounter for antineoplastic immunotherapy    Generalized weakness    Dehydration  Resolved Problems:    * No resolved hospital problems. *  Plan:  1. Pain mgt consulted as discussed with care coordinator  2.  Continue with IV Lasix for
Fall Risk    Subjective   General  Chart Reviewed: Yes  Family / Caregiver Present: No  Subjective  Subjective: \"i can't do much due to my cancer\"  General Comment  Comments: agreeable to bed mob/therex  Pre Treatment Pain Screening  Pain at present: 8  Scale Used: Numeric Score  Intervention List: Patient able to continue with treatment    Pain Screening  Patient Currently in Pain: Yes     Pain Reassessment: 8  Pain Assessment  Pain Assessment: 0-10  Pain Level: 8  Pain Type: Chronic pain  Pain Location: Arm; Shoulder  Pain Orientation: Right  Pain Descriptors: Aching  Pain Frequency: Continuous   Orientation  Orientation  Overall Orientation Status: Within Normal Limits    Objective   Bed mobility  Rolling to Left: Moderate assistance  Rolling to Right: Moderate assistance  Comment: pt declined to sit up at eob. pt agreeable to roll from side to side multiple times. pt feels comfortable on his right side versus left. Transfers  Comment: pt declined to try transfer. did not test due to safety concerns at this time. Exercises  Comments: general lower extremity stretches bilaterally to pts tolerance while supine. pt able to assist with motions into hip flexion and abduction. Assessment pt states that he finally had a bm last night after 12 days. States he feels so much better but still feels full. Pt agreeable to tx and was able to tolerate stretches and participate in bed mobility. Pt more talkative this session about his family but states he knows he can't go home at this level of need.          Discharge Recommendations:  Continue to assess pending progress, Patient would benefit from continued therapy after discharge    Goals  Short term goals  Short term goal 1: Patient will be SBA HEP  Short term goal 2: Patient will complete bed mobility min A  Short term goal 3: Patient will sit<>stand min A +2  Short term goal 4: Patient will maintain static standing balance with BUE >2 minutes   Short term
Restrictions:  Restrictions/Precautions: Fall Risk    SUBJECTIVE:  General  Chart Reviewed: Yes  Family / Caregiver Present: No  Subjective  Subjective: I'll try my best.     Pre Pain Assessment:  Pre Treatment Pain Screening  Pain at present: 5  Scale Used: Numeric Score  Intervention List: Patient able to continue with treatment  Pain Screening  Patient Currently in Pain: Yes       Post Pain Assessment:   Pain Assessment  Pain Assessment: 0-10  Pain Level: 5  Pain Type: Chronic pain  Pain Location: Arm;Neck  Pain Orientation: Right       OBJECTIVE:         Bed mobility  Supine to Sit: Moderate assistance  Sit to Supine: Maximum assistance  Comment: HOB elevated. Transfers  Comment: Pt. unable to complete stand even from raised bed with +2 assist.                Exercises  Quad Sets: x 20  Heelslides: x 5  Gluteal Sets: x 20  Hip Abduction: x 10  Knee Long Arc Quad: x 15  Ankle Pumps: x 20                     ASSESSMENT:  Body structures, Functions, Activity limitations: Decreased functional mobility ; Decreased safe awareness;Decreased balance;Decreased coordination;Decreased endurance;Decreased strength    Assessment: Pt. unable to complete STS even from raised bed. Pt. with good motivation requesting written HEP, written HEP provided pt. verbalizing understanding.      Activity Tolerance  Activity Tolerance: Patient Tolerated treatment well       Discharge Recommendations:  Continue to assess pending progress, Patient would benefit from continued therapy after discharge    Goals:  Short term goals  Short term goal 1: Patient will be SBA HEP  Short term goal 2: Patient will complete bed mobility min A  Short term goal 3: Patient will sit<>stand min A +2  Short term goal 4: Patient will maintain static standing balance with BUE >2 minutes   Short term goal 5: Patient will transfer bed<>chair max A  Long term goals  Long term goal 1: Patient will progress to ambulation as tolerated   Patient Goals   Patient
results for input(s): BNP in the last 72 hours. INR:   Recent Labs      12/11/18   1126  12/12/18   0600   INR  1.1  1.1       Objective:     Vitals:    12/11/18 2009 12/12/18 0735 12/12/18 1117 12/12/18 1956   BP: 135/71 128/69 131/76 138/76   Pulse: 76 63 98 86   Resp: 20 16 16 16   Temp: 98.8 °F (37.1 °C) 97.9 °F (36.6 °C) 97.7 °F (36.5 °C) 98.6 °F (37 °C)   TempSrc: Axillary Oral Oral Oral   SpO2: 97% 97% (!) 85% 98%   Weight:       Height:         General appearance: alert and cooperative with exam  Lungs: clear to auscultation bilaterally  Heart: regular rate and rhythm, S1, S2 normal, no murmur, click, rub or gallop  Abdomen: soft, non-tender; bowel sounds normal; no masses,  no organomegaly  Extremities: extremities normal, atraumatic, no cyanosis or edema  Neurologic: No obvious focal neurologic deficits. Assessment and Plan:       Advance Directive: Full Code  DVT prophylaxis with enoxaparin 40 mg sub-Q daily. Discharge planning: SNF    Active Problems:    Unable to ambulate    Renal cell carcinoma of right kidney (HCC)    Status post nephrectomy    S/P radiotherapy    Cervical radiculopathy due to neoplasm    Cervical disc disorder at C6-C7 level with radiculopathy    Morbid obesity (HCC)    COPD (chronic obstructive pulmonary disease) (HCC)    MARIBEL on CPAP    Paresthesia of right upper extremity    Weakness of both lower extremities    History of pulmonary embolism    S/P bariatric surgery    Chronic pain due to neoplasm    Encounter for antineoplastic immunotherapy    Generalized weakness    Dehydration  Resolved Problems:    * No resolved hospital problems. *  Plan:  1. Start new chemotherapy as per Oncologist recommendation  2. PT/OT/Rehab  3. Dulcolax 4 tabs po x 1 now. 4. Bipap while awake   5. Diurese with IV Lasix at 40 mg IVP q 12 hrs.   6. Feed    Wendy Guillaume MD
boluses  2. Hydrate  3. Oncology eval  4. PT/OT eval and treat  5. Vitamin B 12 1000 mcg IM x 1  6.  Golytely prep if still constipated    Celena Arellano MD
Patient will maintain static standing balance with BUE >2 minutes   Short term goal 5: Patient will transfer bed<>chair max A  Long term goals  Long term goal 1: Patient will progress to ambulation as tolerated     [facility] (6 CLICK) Murtaza Kennedy 28 Inpatient Mobility Raw Score : 9     Therapy Time:   Individual   Time In 1330   Time Out [address], [place], 12/06/18 at 1:56 PM

## 2018-12-14 NOTE — ONCOLOGY
injection vial 4 Units, 4 Units, Subcutaneous, TID WC  ondansetron (ZOFRAN) injection 4 mg, 4 mg, Intravenous, Q4H PRN  sodium chloride flush 0.9 % injection 10 mL, 10 mL, Intravenous, 2 times per day  sodium chloride flush 0.9 % injection 10 mL, 10 mL, Intravenous, PRN  docusate sodium (COLACE) capsule 100 mg, 100 mg, Oral, BID  fentaNYL (DURAGESIC) 75 MCG/HR 1 patch, 1 patch, Transdermal, S94I  folic acid (FOLVITE) tablet 1 mg, 1 mg, Oral, Daily  ipratropium-albuterol (DUONEB) nebulizer solution 3 mL, 1 vial, Inhalation, Q2H PRN  levothyroxine (SYNTHROID) tablet 50 mcg, 50 mcg, Oral, Daily  mupirocin (BACTROBAN) 2 % ointment, , Topical, Daily  warfarin (COUMADIN) tablet 10 mg, 10 mg, Oral, Daily    PHYSICAL EXAM:    VITALS:  /71   Pulse 91   Temp 97.5 °F (36.4 °C)   Resp 16   Ht 6' 2\" (1.88 m)   Wt (!) 421 lb 1.3 oz (191 kg)   SpO2 (!) 89%   BMI 54.06 kg/m²   INTAKE/OUTPUT:    Intake/Output Summary (Last 24 hours) at 12/14/18 2992  Last data filed at 12/14/18 0548   Gross per 24 hour   Intake              960 ml   Output             2300 ml   Net            -1340 ml     CONSTITUTIONAL:  awake, alert, cooperative, no apparent distress, and appears stated age. Morbidly obese. NECK:  Supple, symmetrical, trachea midline, no adenopathy, thyroid symmetric, not enlarged and no tenderness, skin normal  LUNGS:  No increased work of breathing, good air exchange, clear to auscultation bilaterally, no crackles or wheezing  CARDIOVASCULAR:  Normal apical impulse, regular rate and rhythm, normal S1 and S2, no S3 or S4, and no murmur noted  ABDOMEN:  No scars, normal bowel sounds, soft, non-distended, non-tender, no masses palpated, no hepatosplenomegally  MUSCULOSKELETAL:  There is no redness, warmth, or swelling of the joints. Weakness in lower extremities, but bilateral movement. NEUROLOGIC:  Awake, alert, oriented to name, place and time. Cranial nerves II-XII are grossly intact.  Sensory decreased lower advanced through the sheath, up the arm and into the central vasculature. It was positioned appropriately. The sheath was removed. The catheter was shown to aspirate and infuse properly. The flange of the catheter was affixed to the arm using a PICC securement device. A spot image of the chest showed the tip of the PICC line to lie in the superior vena cava. The patient tolerated the procedure well and without complications. Number of films: 1 Fluoroscopy time: 9.6 seconds CONCLUSION: SUCCESSFUL RUE PICC PLACEMENT WITHOUT IMMEDIATE COMPLICATIONS. [place] Ultrasound Guidance Vascular Access    Result Date: 12/7/2018  PERIPHERALLY INSERTED CENTRAL CATHETER (PICC) PLACEMENT WITH ULTRASOUND GUIDANCE CLINICAL HISTORY:  RIGHT LEG WOUND, PATIENT NEEDS CENTRAL VENOUS ACCESS FOR IV ANTIBIOTIC THERAPY AND HYDRATION, PHYSICIAN DISPLACEMENT OF PICC LINE After discussing the procedure and possible complications with the patient, informed consent was obtained. The patient was placed on the Special Procedures table. The right upper extremity was sterilely prepared using 2% chlorhexidine. Central venous catheter was inserted using a maximal sterile barrier technique which includes cap, mask, sterile gown, sterile gloves, sterile full-body drape, hand hygiene and 2% chlorhexidine for cutaneous antisepsis. A sterile ultrasound technique with sterile gel and sterile probe covers was also utilized. A pre-procedure time out was performed in order to assure the correct patient and procedure. Local anesthetic was administered. Utilizing sterile gel and sterile probe covers, a peripheral vein was accessed with sonographic guidance. A sonographic spot image was obtained for documentation. A guidewire was advanced into the vein with fluoroscopic guidance and a sheath was placed over the guidewire. A 5-Cambodian dual-lumen PICC was advanced through the sheath, up the arm and into the central vasculature.   It was positioned

## 2018-12-15 NOTE — DISCHARGE SUMMARY
better, and the patient seems to be improved, Pain Management  seems to handle pain control better, and eventfully chemotherapy will be  as soon as available as per the oncologist's recommendation. Neurology  also saw the patient which reported no further recommendations as  patient issues rely mostly on the worsening process of the C6 and C7. The chemotherapy is the only possible treatment for resolution of the  worsening metastatic lesion in the C6 and C7 with nerve compression and  resulting into bilateral lower extremity paraparesis and also right  upper extremity weakness. The patient was transferred to skilled nursing facility for further  rehabilitation and eventually palliative care. The plan of care was  discussed with the patient as well as with the family as well as with  the discharging RN; and medications were reconciled. The time spent on the care of this patient is about 39 minutes.         Felipa Dumont MD    D: 12/14/2018 11:27:55       T: 12/15/2018 1:24:29     [email or web address]  Job#: [id number]     Doc#: [id number]    CC:

## 2018-12-17 ENCOUNTER — OFFICE VISIT (OUTPATIENT)
Dept: GERIATRIC MEDICINE | Age: 53
End: 2018-12-17
Payer: COMMERCIAL

## 2018-12-17 DIAGNOSIS — G89.3 CHRONIC PAIN DUE TO NEOPLASM: ICD-10-CM

## 2018-12-17 DIAGNOSIS — K59.01 SLOW TRANSIT CONSTIPATION: Primary | ICD-10-CM

## 2018-12-17 PROCEDURE — 99309 SBSQ NF CARE MODERATE MDM 30: CPT | Performed by: NURSE PRACTITIONER

## 2018-12-18 RX ORDER — DEXTROAMPHETAMINE SACCHARATE, AMPHETAMINE ASPARTATE, DEXTROAMPHETAMINE SULFATE AND AMPHETAMINE SULFATE 5; 5; 5; 5 MG/1; MG/1; MG/1; MG/1
20 TABLET ORAL DAILY
COMMUNITY
End: 2019-02-01

## 2018-12-19 ENCOUNTER — OFFICE VISIT (OUTPATIENT)
Dept: PALLATIVE CARE | Age: 53
End: 2018-12-19
Payer: COMMERCIAL

## 2018-12-19 VITALS
DIASTOLIC BLOOD PRESSURE: 80 MMHG | RESPIRATION RATE: 18 BRPM | SYSTOLIC BLOOD PRESSURE: 152 MMHG | HEART RATE: 80 BPM | OXYGEN SATURATION: 98 %

## 2018-12-19 DIAGNOSIS — C64.9 METASTATIC RENAL CELL CARCINOMA, UNSPECIFIED LATERALITY (HCC): ICD-10-CM

## 2018-12-19 DIAGNOSIS — G89.3 NEOPLASM RELATED PAIN: Primary | ICD-10-CM

## 2018-12-19 DIAGNOSIS — K59.01 SLOW TRANSIT CONSTIPATION: ICD-10-CM

## 2018-12-19 DIAGNOSIS — Z51.5 PALLIATIVE CARE ENCOUNTER: ICD-10-CM

## 2018-12-19 PROCEDURE — 99309 SBSQ NF CARE MODERATE MDM 30: CPT | Performed by: NURSE PRACTITIONER

## 2018-12-19 RX ORDER — METHADONE HYDROCHLORIDE 10 MG/1
10 TABLET ORAL EVERY 12 HOURS
Qty: 60 TABLET | Refills: 0
Start: 2018-12-19 | End: 2018-12-27 | Stop reason: DRUGHIGH

## 2018-12-19 ASSESSMENT — ENCOUNTER SYMPTOMS
DIARRHEA: 0
SHORTNESS OF BREATH: 0
CHEST TIGHTNESS: 0
VOMITING: 0
TROUBLE SWALLOWING: 0
ABDOMINAL PAIN: 0
COUGH: 0
ABDOMINAL DISTENTION: 0
NAUSEA: 0
COLOR CHANGE: 0
CONSTIPATION: 1
WHEEZING: 0

## 2018-12-19 NOTE — PROGRESS NOTES
Subjective:      Patient Id:  Avelina Balderas is a 48 y.o. male who presents today with   Chief Complaint   Patient presents with    Follow-Up from Hospital    Pain    and is seen at [place] [id number],      HPI  Pt seen and examined at [facility] after DC from [facility] on 12/14/18 after 10 days stay for BLE weakness, paraparesis, RUE weakness, neoplasm related pain, constipation, RCC with mets to spine with nerve compression. Pt A&O x3, NAD, pleasant and cooperative. He has limited movement of BLE with numbness and paresthesia as well as numbness/ paresthesia to RUE. Pt unable to ambulate at this time due to BLE weakness. Pt started on new oral chemo today. He is currently on fentanyl transdermal patch for his pain of RUE with mod relief. He has prn dilaudid for BTP but is not using it due to fear of sedation and further constipation. Pain is burning and aching in nature. Pt still with c/o constipation. Denies n/v or abd pain. 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      Social History Main Topics    Smoking status: Never Smoker    Smokeless tobacco: Never Used    Alcohol use No    Drug use: No    Sexual activity: Not on file     Other Topics Concern    Not on file     Social History Narrative    No narrative on file     Allergies   Allergen Reactions    Codeine     Heparin      Current Outpatient Prescriptions on File Prior to Visit   Medication Sig Dispense Refill

## 2018-12-21 ENCOUNTER — TELEPHONE (OUTPATIENT)
Dept: PALLATIVE CARE | Age: 53
End: 2018-12-21

## 2018-12-21 LAB
BUN BLDV-MCNC: 41 MG/DL
CALCIUM SERPL-MCNC: 9.5 MG/DL
CHLORIDE BLD-SCNC: 89 MMOL/L
CO2: 39 MMOL/L
CREAT SERPL-MCNC: 0.9 MG/DL
GFR CALCULATED: 88
GLUCOSE BLD-MCNC: 157 MG/DL
INR BLD: 1.7
POTASSIUM SERPL-SCNC: 4 MMOL/L
PROTIME: 18.6 SECONDS
SODIUM BLD-SCNC: 141 MMOL/L

## 2018-12-27 ENCOUNTER — OFFICE VISIT (OUTPATIENT)
Dept: PALLATIVE CARE | Age: 53
End: 2018-12-27
Payer: COMMERCIAL

## 2018-12-27 VITALS
HEART RATE: 98 BPM | RESPIRATION RATE: 18 BRPM | OXYGEN SATURATION: 96 % | SYSTOLIC BLOOD PRESSURE: 112 MMHG | DIASTOLIC BLOOD PRESSURE: 76 MMHG

## 2018-12-27 DIAGNOSIS — Z51.5 PALLIATIVE CARE ENCOUNTER: ICD-10-CM

## 2018-12-27 DIAGNOSIS — R06.02 SOB (SHORTNESS OF BREATH): ICD-10-CM

## 2018-12-27 DIAGNOSIS — R05.9 COUGH: ICD-10-CM

## 2018-12-27 DIAGNOSIS — K59.01 SLOW TRANSIT CONSTIPATION: ICD-10-CM

## 2018-12-27 DIAGNOSIS — C64.9 METASTATIC RENAL CELL CARCINOMA, UNSPECIFIED LATERALITY (HCC): ICD-10-CM

## 2018-12-27 DIAGNOSIS — R06.2 WHEEZE: ICD-10-CM

## 2018-12-27 DIAGNOSIS — G89.3 NEOPLASM RELATED PAIN: Primary | ICD-10-CM

## 2018-12-27 LAB
BASOPHILS ABSOLUTE: NORMAL /ΜL
BASOPHILS RELATIVE PERCENT: 0.7 %
EOSINOPHILS ABSOLUTE: NORMAL /ΜL
EOSINOPHILS RELATIVE PERCENT: 0.3 %
HCT VFR BLD CALC: 42.7 % (ref 41–53)
HEMOGLOBIN: 13.8 G/DL (ref 13.5–17.5)
LYMPHOCYTES ABSOLUTE: 1.1 /ΜL
LYMPHOCYTES RELATIVE PERCENT: 16.4 %
MCH RBC QN AUTO: 25.9 PG
MCHC RBC AUTO-ENTMCNC: 32.4 G/DL
MCV RBC AUTO: 80 FL
MONOCYTES ABSOLUTE: 0.6 /ΜL
MONOCYTES RELATIVE PERCENT: 9.9 %
NEUTROPHILS ABSOLUTE: 4.7 /ΜL
NEUTROPHILS RELATIVE PERCENT: 72.7 %
PLATELET # BLD: 141 K/ΜL
PMV BLD AUTO: NORMAL FL
RBC # BLD: 5.34 10^6/ΜL
WBC # BLD: 6.5 10^3/ML

## 2018-12-27 PROCEDURE — 99309 SBSQ NF CARE MODERATE MDM 30: CPT | Performed by: NURSE PRACTITIONER

## 2018-12-27 RX ORDER — METHADONE HYDROCHLORIDE 10 MG/1
10 TABLET ORAL EVERY 8 HOURS
Qty: 90 TABLET | Refills: 0 | Status: SHIPPED
Start: 2018-12-27 | End: 2019-01-16 | Stop reason: SDUPTHER

## 2018-12-27 ASSESSMENT — ENCOUNTER SYMPTOMS
STRIDOR: 0
RHINORRHEA: 0
NAUSEA: 0
CONSTIPATION: 1
ABDOMINAL PAIN: 0
SINUS PAIN: 0
ABDOMINAL DISTENTION: 0
COUGH: 1
SHORTNESS OF BREATH: 1
SORE THROAT: 0
DIARRHEA: 0
CHEST TIGHTNESS: 0
TROUBLE SWALLOWING: 0
SINUS PRESSURE: 0
VOMITING: 0
COLOR CHANGE: 0
WHEEZING: 1
EYE DISCHARGE: 0

## 2018-12-28 LAB
INR BLD: 3.1
PROTIME: 34.7 SECONDS

## 2019-01-02 ENCOUNTER — TELEPHONE (OUTPATIENT)
Dept: PALLATIVE CARE | Age: 54
End: 2019-01-02

## 2019-01-03 LAB
INR BLD: 4.1
PROTIME: 46.4 SECONDS

## 2019-01-04 ENCOUNTER — OFFICE VISIT (OUTPATIENT)
Dept: GERIATRIC MEDICINE | Age: 54
End: 2019-01-04
Payer: COMMERCIAL

## 2019-01-04 DIAGNOSIS — D49.7 SPINAL CORD NEOPLASM: Primary | ICD-10-CM

## 2019-01-04 DIAGNOSIS — I82.A19 DEEP VEIN THROMBOSIS (DVT) OF AXILLARY VEIN, UNSPECIFIED CHRONICITY, UNSPECIFIED LATERALITY (HCC): ICD-10-CM

## 2019-01-04 DIAGNOSIS — J96.12 CHRONIC RESPIRATORY FAILURE WITH HYPERCAPNIA (HCC): ICD-10-CM

## 2019-01-04 DIAGNOSIS — R06.89 HYPERCAPNEMIA: ICD-10-CM

## 2019-01-04 LAB
INR BLD: 3.7
PROTIME: 42.2 SECONDS

## 2019-01-04 PROCEDURE — 99309 SBSQ NF CARE MODERATE MDM 30: CPT | Performed by: NURSE PRACTITIONER

## 2019-01-06 PROBLEM — E86.0 DEHYDRATION: Status: RESOLVED | Noted: 2018-12-07 | Resolved: 2019-01-06

## 2019-01-07 ENCOUNTER — TELEPHONE (OUTPATIENT)
Dept: PALLATIVE CARE | Age: 54
End: 2019-01-07

## 2019-01-07 LAB
BASOPHILS ABSOLUTE: ABNORMAL /ΜL
BASOPHILS RELATIVE PERCENT: 0.5 %
BUN BLDV-MCNC: 38 MG/DL
CALCIUM SERPL-MCNC: 9.8 MG/DL
CHLORIDE BLD-SCNC: 90 MMOL/L
CO2: 40 MMOL/L
CREAT SERPL-MCNC: 0.7 MG/DL
EOSINOPHILS ABSOLUTE: ABNORMAL /ΜL
EOSINOPHILS RELATIVE PERCENT: 0.8 %
GFR CALCULATED: 118
GLUCOSE BLD-MCNC: 162 MG/DL
HCT VFR BLD CALC: 39.7 % (ref 41–53)
HEMOGLOBIN: 13 G/DL (ref 13.5–17.5)
INR BLD: 1.2
LYMPHOCYTES ABSOLUTE: ABNORMAL /ΜL
LYMPHOCYTES RELATIVE PERCENT: 21.2 %
MCH RBC QN AUTO: 26.5 PG
MCHC RBC AUTO-ENTMCNC: 32.7 G/DL
MCV RBC AUTO: 81.3 FL
MONOCYTES ABSOLUTE: 0.3 /ΜL
MONOCYTES RELATIVE PERCENT: 5.5 %
NEUTROPHILS ABSOLUTE: 4.3 /ΜL
NEUTROPHILS RELATIVE PERCENT: 72 %
PLATELET # BLD: 98 K/ΜL
PMV BLD AUTO: 9.1 FL
POTASSIUM SERPL-SCNC: 4.1 MMOL/L
PROTIME: 12.7 SECONDS
RBC # BLD: 4.89 10^6/ΜL
SODIUM BLD-SCNC: 141 MMOL/L
WBC # BLD: 6 10^3/ML

## 2019-01-09 LAB
BUN BLDV-MCNC: 38 MG/DL
CALCIUM SERPL-MCNC: 9.2 MG/DL
CHLORIDE BLD-SCNC: 89 MMOL/L
CO2: 40 MMOL/L
CREAT SERPL-MCNC: 0.7 MG/DL
GFR CALCULATED: 118
GLUCOSE BLD-MCNC: 179 MG/DL
INR BLD: 1.6
POTASSIUM SERPL-SCNC: 4.3 MMOL/L
PROTIME: 17 SECONDS
SODIUM BLD-SCNC: 139 MMOL/L

## 2019-01-10 ENCOUNTER — OFFICE VISIT (OUTPATIENT)
Dept: PALLATIVE CARE | Age: 54
End: 2019-01-10
Payer: COMMERCIAL

## 2019-01-10 VITALS — RESPIRATION RATE: 18 BRPM | HEART RATE: 80 BPM | OXYGEN SATURATION: 98 %

## 2019-01-10 DIAGNOSIS — R05.9 COUGH: ICD-10-CM

## 2019-01-10 DIAGNOSIS — G89.3 NEOPLASM RELATED PAIN: Primary | ICD-10-CM

## 2019-01-10 DIAGNOSIS — Z51.5 PALLIATIVE CARE ENCOUNTER: ICD-10-CM

## 2019-01-10 DIAGNOSIS — K59.01 SLOW TRANSIT CONSTIPATION: ICD-10-CM

## 2019-01-10 DIAGNOSIS — C64.9 METASTATIC RENAL CELL CARCINOMA, UNSPECIFIED LATERALITY (HCC): ICD-10-CM

## 2019-01-10 PROCEDURE — 99308 SBSQ NF CARE LOW MDM 20: CPT | Performed by: NURSE PRACTITIONER

## 2019-01-10 ASSESSMENT — ENCOUNTER SYMPTOMS
DIARRHEA: 0
CONSTIPATION: 1
ABDOMINAL PAIN: 0
NAUSEA: 0
VOMITING: 0
ABDOMINAL DISTENTION: 0
COUGH: 1
CHEST TIGHTNESS: 0
TROUBLE SWALLOWING: 0
SHORTNESS OF BREATH: 0
COLOR CHANGE: 0
WHEEZING: 0

## 2019-01-11 LAB
BUN BLDV-MCNC: 31 MG/DL
CALCIUM SERPL-MCNC: 9 MG/DL
CHLORIDE BLD-SCNC: 90 MMOL/L
CO2: 41 MMOL/L
CREAT SERPL-MCNC: 0.7 MG/DL
GFR CALCULATED: 118
GLUCOSE BLD-MCNC: 220 MG/DL
INR BLD: 2
POTASSIUM SERPL-SCNC: 3.7 MMOL/L
PROTIME: 21.7 SECONDS
SODIUM BLD-SCNC: 142 MMOL/L

## 2019-01-14 VITALS
DIASTOLIC BLOOD PRESSURE: 80 MMHG | WEIGHT: 315 LBS | SYSTOLIC BLOOD PRESSURE: 143 MMHG | OXYGEN SATURATION: 98 % | TEMPERATURE: 98.1 F | HEART RATE: 79 BPM | RESPIRATION RATE: 16 BRPM | BODY MASS INDEX: 51.49 KG/M2

## 2019-01-16 DIAGNOSIS — G89.3 NEOPLASM RELATED PAIN: ICD-10-CM

## 2019-01-16 DIAGNOSIS — C64.9 METASTATIC RENAL CELL CARCINOMA, UNSPECIFIED LATERALITY (HCC): ICD-10-CM

## 2019-01-16 DIAGNOSIS — Z51.5 PALLIATIVE CARE ENCOUNTER: ICD-10-CM

## 2019-01-16 LAB
BUN BLDV-MCNC: 40 MG/DL
CALCIUM SERPL-MCNC: 9.7 MG/DL
CHLORIDE BLD-SCNC: 88 MMOL/L
CO2: 42 MMOL/L
CREAT SERPL-MCNC: 0.7 MG/DL
GFR CALCULATED: ABNORMAL
GLUCOSE BLD-MCNC: 162 MG/DL
INR BLD: 3.8
POTASSIUM SERPL-SCNC: 3.8 MMOL/L
PROTIME: 40.3 SECONDS
SODIUM BLD-SCNC: 139 MMOL/L

## 2019-01-16 RX ORDER — METHADONE HYDROCHLORIDE 10 MG/1
10 TABLET ORAL EVERY 8 HOURS
Qty: 90 TABLET | Refills: 0 | Status: SHIPPED | OUTPATIENT
Start: 2019-01-16 | End: 2019-02-15

## 2019-01-18 LAB
BUN BLDV-MCNC: 34 MG/DL
CALCIUM SERPL-MCNC: 9.2 MG/DL
CHLORIDE BLD-SCNC: 86 MMOL/L
CO2: 44 MMOL/L
CREAT SERPL-MCNC: 0.7 MG/DL
GFR CALCULATED: 118
GLUCOSE BLD-MCNC: 197 MG/DL
INR BLD: 3
POTASSIUM SERPL-SCNC: 3.6 MMOL/L
PROTIME: 31.7 SECONDS
SODIUM BLD-SCNC: 140 MMOL/L

## 2019-02-01 LAB
BASOPHILS ABSOLUTE: ABNORMAL /ΜL
BASOPHILS RELATIVE PERCENT: 0.4 %
BUN BLDV-MCNC: 18 MG/DL
CALCIUM SERPL-MCNC: 9 MG/DL
CHLORIDE BLD-SCNC: 95 MMOL/L
CO2: 40 MMOL/L
CREAT SERPL-MCNC: 0.5 MG/DL
EOSINOPHILS ABSOLUTE: 0.1 /ΜL
EOSINOPHILS RELATIVE PERCENT: 1.6 %
GFR CALCULATED: NORMAL
GLUCOSE BLD-MCNC: 76 MG/DL
HCT VFR BLD CALC: 36.7 % (ref 41–53)
HEMOGLOBIN: 12.2 G/DL (ref 13.5–17.5)
INR BLD: 2.4
LYMPHOCYTES ABSOLUTE: 0.9 /ΜL
LYMPHOCYTES RELATIVE PERCENT: 16.7 %
MCH RBC QN AUTO: 27.8 PG
MCHC RBC AUTO-ENTMCNC: 33.3 G/DL
MCV RBC AUTO: 83.3 FL
MONOCYTES ABSOLUTE: 0.4 /ΜL
MONOCYTES RELATIVE PERCENT: 6.9 %
NEUTROPHILS ABSOLUTE: 4 /ΜL
NEUTROPHILS RELATIVE PERCENT: 74.4 %
PLATELET # BLD: 90 K/ΜL
PMV BLD AUTO: 8.7 FL
POTASSIUM SERPL-SCNC: 4.4 MMOL/L
PROTIME: 25.6 SECONDS
RBC # BLD: ABNORMAL 10^6/ΜL
SODIUM BLD-SCNC: 139 MMOL/L
WBC # BLD: 5.3 10^3/ML

## 2019-02-01 RX ORDER — DEXTROAMPHETAMINE SULFATE 10 MG/1
10 TABLET ORAL 2 TIMES DAILY
COMMUNITY
End: 2019-02-04 | Stop reason: SDUPTHER

## 2019-02-04 ENCOUNTER — OFFICE VISIT (OUTPATIENT)
Dept: GERIATRIC MEDICINE | Age: 54
End: 2019-02-04
Payer: COMMERCIAL

## 2019-02-04 DIAGNOSIS — F90.8 ATTENTION DEFICIT HYPERACTIVITY DISORDER (ADHD), OTHER TYPE: Primary | ICD-10-CM

## 2019-02-04 DIAGNOSIS — I10 BENIGN ESSENTIAL HTN: ICD-10-CM

## 2019-02-04 DIAGNOSIS — Z79.4 TYPE 2 DIABETES MELLITUS WITH COMPLICATION, WITH LONG-TERM CURRENT USE OF INSULIN (HCC): ICD-10-CM

## 2019-02-04 DIAGNOSIS — M54.12: Primary | ICD-10-CM

## 2019-02-04 DIAGNOSIS — D49.9: Primary | ICD-10-CM

## 2019-02-04 DIAGNOSIS — J44.9 CHRONIC OBSTRUCTIVE PULMONARY DISEASE, UNSPECIFIED COPD TYPE (HCC): ICD-10-CM

## 2019-02-04 DIAGNOSIS — E11.8 TYPE 2 DIABETES MELLITUS WITH COMPLICATION, WITH LONG-TERM CURRENT USE OF INSULIN (HCC): ICD-10-CM

## 2019-02-04 LAB
INR BLD: 1.5
PROTIME: 15.5 SECONDS

## 2019-02-04 PROCEDURE — 99309 SBSQ NF CARE MODERATE MDM 30: CPT | Performed by: NURSE PRACTITIONER

## 2019-02-04 RX ORDER — DEXTROAMPHETAMINE SULFATE 10 MG/1
10 TABLET ORAL 2 TIMES DAILY
Qty: 60 TABLET | Refills: 0 | Status: SHIPPED | OUTPATIENT
Start: 2019-02-04 | End: 2019-03-06

## 2019-02-05 DIAGNOSIS — G89.3 CANCER ASSOCIATED PAIN: ICD-10-CM

## 2019-02-06 RX ORDER — HYDROMORPHONE HYDROCHLORIDE 4 MG/1
4 TABLET ORAL EVERY 4 HOURS PRN
Qty: 60 TABLET | Refills: 0 | Status: SHIPPED | OUTPATIENT
Start: 2019-02-06 | End: 2019-03-08

## 2019-02-07 LAB
INR BLD: 1.5
PROTIME: 16.3 SECONDS

## 2019-02-08 LAB
BASOPHILS ABSOLUTE: NORMAL /ΜL
BASOPHILS RELATIVE PERCENT: 0.3 %
BUN BLDV-MCNC: 27 MG/DL
CALCIUM SERPL-MCNC: 9.4 MG/DL
CHLORIDE BLD-SCNC: 86 MMOL/L
CO2: 46 MMOL/L
CREAT SERPL-MCNC: 0.5 MG/DL
EOSINOPHILS ABSOLUTE: 0.1 /ΜL
EOSINOPHILS RELATIVE PERCENT: 1.4 %
GFR CALCULATED: NORMAL
GLUCOSE BLD-MCNC: NORMAL MG/DL
HCT VFR BLD CALC: 43.9 % (ref 41–53)
HEMOGLOBIN: 14.3 G/DL (ref 13.5–17.5)
LYMPHOCYTES ABSOLUTE: 0.9 /ΜL
LYMPHOCYTES RELATIVE PERCENT: 16.3 %
MCH RBC QN AUTO: 27.2 PG
MCHC RBC AUTO-ENTMCNC: 32.5 G/DL
MCV RBC AUTO: 83.7 FL
MONOCYTES ABSOLUTE: 0.3 /ΜL
MONOCYTES RELATIVE PERCENT: 4.7 %
NEUTROPHILS ABSOLUTE: 4.3 /ΜL
NEUTROPHILS RELATIVE PERCENT: 77.3 %
PLATELET # BLD: 99 K/ΜL
PMV BLD AUTO: 8.3 FL
POTASSIUM SERPL-SCNC: 3.5 MMOL/L
RBC # BLD: 5.25 10^6/ΜL
SODIUM BLD-SCNC: 145 MMOL/L
WBC # BLD: 5.6 10^3/ML

## 2019-02-11 LAB
INR BLD: 1.7
PROTIME: 17.8 SECONDS

## 2019-02-12 LAB
BUN BLDV-MCNC: 25 MG/DL
CALCIUM SERPL-MCNC: 8.6 MG/DL
CHLORIDE BLD-SCNC: 65 MMOL/L
CO2: 45 MMOL/L
CREAT SERPL-MCNC: 0.5 MG/DL
GFR CALCULATED: 174
GLUCOSE BLD-MCNC: 128 MG/DL
POTASSIUM SERPL-SCNC: 3.4 MMOL/L
SODIUM BLD-SCNC: 140 MMOL/L

## 2019-02-14 LAB
INR BLD: 1.9
PROTIME: 20.3 SECONDS

## 2019-02-15 ENCOUNTER — OFFICE VISIT (OUTPATIENT)
Dept: GERIATRIC MEDICINE | Age: 54
End: 2019-02-15
Payer: COMMERCIAL

## 2019-02-15 ENCOUNTER — OFFICE VISIT (OUTPATIENT)
Dept: PALLATIVE CARE | Age: 54
End: 2019-02-15
Payer: COMMERCIAL

## 2019-02-15 VITALS — HEART RATE: 88 BPM | RESPIRATION RATE: 18 BRPM | OXYGEN SATURATION: 98 %

## 2019-02-15 DIAGNOSIS — T38.0X5A STEROID-INDUCED HYPERGLYCEMIA: ICD-10-CM

## 2019-02-15 DIAGNOSIS — G89.3 NEOPLASM RELATED PAIN: Primary | ICD-10-CM

## 2019-02-15 DIAGNOSIS — R73.9 STEROID-INDUCED HYPERGLYCEMIA: ICD-10-CM

## 2019-02-15 DIAGNOSIS — Z51.5 PALLIATIVE CARE ENCOUNTER: ICD-10-CM

## 2019-02-15 DIAGNOSIS — E16.2 HYPOGLYCEMIA: Primary | ICD-10-CM

## 2019-02-15 DIAGNOSIS — C64.9 METASTATIC RENAL CELL CARCINOMA, UNSPECIFIED LATERALITY (HCC): ICD-10-CM

## 2019-02-15 LAB
BASOPHILS ABSOLUTE: NORMAL /ΜL
BASOPHILS RELATIVE PERCENT: 0.6 %
EOSINOPHILS ABSOLUTE: 0.1 /ΜL
EOSINOPHILS RELATIVE PERCENT: 1.7 %
HCT VFR BLD CALC: 42.8 % (ref 41–53)
HEMOGLOBIN: 14.5 G/DL (ref 13.5–17.5)
LYMPHOCYTES ABSOLUTE: 1.5 /ΜL
LYMPHOCYTES RELATIVE PERCENT: 29 %
MCH RBC QN AUTO: 27.5 PG
MCHC RBC AUTO-ENTMCNC: 33.8 G/DL
MCV RBC AUTO: 81.4 FL
MONOCYTES ABSOLUTE: 0.3 /ΜL
MONOCYTES RELATIVE PERCENT: 6.5 %
NEUTROPHILS ABSOLUTE: 3.2 /ΜL
NEUTROPHILS RELATIVE PERCENT: 62.2 %
PLATELET # BLD: 109 K/ΜL
PMV BLD AUTO: 8.8 FL
RBC # BLD: 5.26 10^6/ΜL
WBC # BLD: 5.1 10^3/ML

## 2019-02-15 PROCEDURE — 99308 SBSQ NF CARE LOW MDM 20: CPT | Performed by: NURSE PRACTITIONER

## 2019-02-15 RX ORDER — DULOXETIN HYDROCHLORIDE 60 MG/1
60 CAPSULE, DELAYED RELEASE ORAL DAILY
COMMUNITY

## 2019-02-15 ASSESSMENT — ENCOUNTER SYMPTOMS
NAUSEA: 0
VOMITING: 0
CONSTIPATION: 0
CHEST TIGHTNESS: 0
TROUBLE SWALLOWING: 0
EYE DISCHARGE: 0
DIARRHEA: 0
COLOR CHANGE: 0
ABDOMINAL PAIN: 0
SHORTNESS OF BREATH: 0
ABDOMINAL DISTENTION: 0
BACK PAIN: 1
WHEEZING: 0
COUGH: 0

## 2019-02-18 LAB
BUN BLDV-MCNC: 24 MG/DL
CALCIUM SERPL-MCNC: 8.7 MG/DL
CHLORIDE BLD-SCNC: 84 MMOL/L
CO2: 41 MMOL/L
CREAT SERPL-MCNC: 0.5 MG/DL
GFR CALCULATED: NORMAL
GLUCOSE BLD-MCNC: 67 MG/DL
INR BLD: 1.9
POTASSIUM SERPL-SCNC: 3.2 MMOL/L
PROTIME: 20.2 SECONDS
SODIUM BLD-SCNC: 139 MMOL/L

## 2019-02-20 LAB
BUN BLDV-MCNC: 3.4 MG/DL
CALCIUM SERPL-MCNC: 8.6 MG/DL
CHLORIDE BLD-SCNC: 3.4 MMOL/L
CO2: 41 MMOL/L
CREAT SERPL-MCNC: 0.5 MG/DL
GFR CALCULATED: NORMAL
GLUCOSE BLD-MCNC: 68 MG/DL
POTASSIUM SERPL-SCNC: 87 MMOL/L
SODIUM BLD-SCNC: 141 MMOL/L

## 2019-02-21 VITALS
SYSTOLIC BLOOD PRESSURE: 90 MMHG | BODY MASS INDEX: 48.1 KG/M2 | TEMPERATURE: 97.8 F | WEIGHT: 315 LBS | OXYGEN SATURATION: 97 % | HEART RATE: 92 BPM | DIASTOLIC BLOOD PRESSURE: 67 MMHG | RESPIRATION RATE: 18 BRPM

## 2019-02-21 VITALS
SYSTOLIC BLOOD PRESSURE: 121 MMHG | OXYGEN SATURATION: 97 % | BODY MASS INDEX: 47.76 KG/M2 | WEIGHT: 315 LBS | TEMPERATURE: 97.9 F | RESPIRATION RATE: 18 BRPM | HEART RATE: 80 BPM | DIASTOLIC BLOOD PRESSURE: 63 MMHG

## 2019-02-24 PROBLEM — I82.A19: Status: ACTIVE | Noted: 2019-02-24

## 2019-02-24 PROBLEM — D49.7: Status: ACTIVE | Noted: 2019-02-24

## 2019-02-26 VITALS
SYSTOLIC BLOOD PRESSURE: 104 MMHG | RESPIRATION RATE: 18 BRPM | TEMPERATURE: 97.9 F | HEART RATE: 91 BPM | DIASTOLIC BLOOD PRESSURE: 68 MMHG | OXYGEN SATURATION: 97 %

## 2019-03-04 LAB
ALBUMIN SERPL-MCNC: 2.6 G/DL
ALP BLD-CCNC: 95 U/L
ALT SERPL-CCNC: 22 U/L
ANION GAP SERPL CALCULATED.3IONS-SCNC: NORMAL MMOL/L
AST SERPL-CCNC: 21 U/L
BILIRUB SERPL-MCNC: 1.3 MG/DL (ref 0.1–1.4)
BUN BLDV-MCNC: 31 MG/DL
CALCIUM SERPL-MCNC: 9.1 MG/DL
CHLORIDE BLD-SCNC: 86 MMOL/L
CO2: 39 MMOL/L
CREAT SERPL-MCNC: 0.6 MG/DL
GFR CALCULATED: NORMAL
GLUCOSE BLD-MCNC: 73 MG/DL
POTASSIUM SERPL-SCNC: 4.3 MMOL/L
SODIUM BLD-SCNC: 137 MMOL/L
TOTAL PROTEIN: 4.8